# Patient Record
Sex: MALE | Race: OTHER | NOT HISPANIC OR LATINO
[De-identification: names, ages, dates, MRNs, and addresses within clinical notes are randomized per-mention and may not be internally consistent; named-entity substitution may affect disease eponyms.]

---

## 2017-09-24 ENCOUNTER — TRANSCRIPTION ENCOUNTER (OUTPATIENT)
Age: 57
End: 2017-09-24

## 2023-05-10 PROBLEM — Z00.00 ENCOUNTER FOR PREVENTIVE HEALTH EXAMINATION: Status: ACTIVE | Noted: 2023-05-10

## 2023-05-11 ENCOUNTER — APPOINTMENT (OUTPATIENT)
Dept: NEUROSURGERY | Facility: CLINIC | Age: 63
End: 2023-05-11
Payer: COMMERCIAL

## 2023-05-11 VITALS — HEIGHT: 64 IN | BODY MASS INDEX: 36.37 KG/M2 | WEIGHT: 213 LBS

## 2023-05-11 DIAGNOSIS — Z82.49 FAMILY HISTORY OF ISCHEMIC HEART DISEASE AND OTHER DISEASES OF THE CIRCULATORY SYSTEM: ICD-10-CM

## 2023-05-11 DIAGNOSIS — D49.519 NEOPLASM OF UNSPECIFIED BEHAVIOR OF UNSPECIFIED KIDNEY: ICD-10-CM

## 2023-05-11 DIAGNOSIS — F17.200 NICOTINE DEPENDENCE, UNSPECIFIED, UNCOMPLICATED: ICD-10-CM

## 2023-05-11 PROCEDURE — 99204 OFFICE O/P NEW MOD 45 MIN: CPT

## 2023-05-11 RX ORDER — OLMESARTAN MEDOXOMIL / AMLODIPINE BESYLATE / HYDROCHLOROTHIAZIDE 40; 10; 25 MG/1; MG/1; MG/1
TABLET, FILM COATED ORAL
Refills: 0 | Status: ACTIVE | COMMUNITY

## 2023-05-11 RX ORDER — LABETALOL HYDROCHLORIDE 300 MG/1
TABLET, FILM COATED ORAL
Refills: 0 | Status: ACTIVE | COMMUNITY

## 2023-05-11 RX ORDER — ROSUVASTATIN CALCIUM 5 MG/1
TABLET, FILM COATED ORAL
Refills: 0 | Status: ACTIVE | COMMUNITY

## 2023-05-11 RX ORDER — FEBUXOSTAT 80 MG/1
80 TABLET ORAL
Refills: 0 | Status: ACTIVE | COMMUNITY

## 2023-05-11 NOTE — HISTORY OF PRESENT ILLNESS
[de-identified] : This is a 63 yo M who presents for neurosurgical consultation, he is accompanied by his wife. As a review, he notes progressive ambulatory changes along with urinary incontinence spanning the course of 1 year. He had presented to various specialists without answer. Ultimately, MR brain revealed ventriculomegaly to a moderate degree. Shuffling gait reported along with cognitive decline as per patients wife.\par \par MR Brain- [facility]- 3/2023- evidence of ventriculomegaly to a moderate degree\par \par PHYSICAL EXAM: \par \par Constitutional: Well appearing, no distress\par HEENT: Normocephalic Atraumatic\par Psychiatric: Alert and oriented to all spheres, normal mood\par Pulmonary: No respiratory distress\par \par Neurologic: \par CN II-XII grossly intact\par Palpation: no pain to palpation \par Strength: Full strength in all major muscle groups\par Sensation: Full sensation to light touch in all extremities\par Reflexes: \par  2+ patellar\par  2+ ankle jerk\par \par ROM limited\par \par Signs:\par SLR negative\par \par Gait: slow, shuffling gait. Walking w/o assistance.\par

## 2023-05-11 NOTE — ASSESSMENT
[FreeTextEntry1] : This is a 63 yo M who presents for neurosurgical consultation with regards to NPH. We have discussed that after review of his imaging/testing he is a candidate for a high volume LP. Pending the result, he may be a candidate for  shunt placement and we have discussed all relevant procedure details at length and he and his wife are agreeable to proceed.\par \par Bloodwork RX given: CBC, BMP, PT/ INR, PTT\par Rx: Lumbar puncture, high volume tap\par \par Patient to return to office 3 days s/p tap to assess for improvement in gait/cognition/incontinence.\par \par All questions and concerns have been answered at this time and he is largely agreeable to proceed.\par \par Elizabeth Serna PA-C \par Christy Garza MD\par

## 2023-06-05 NOTE — ASU PATIENT PROFILE, ADULT - FALL HARM RISK - RISK INTERVENTIONS

## 2023-06-06 ENCOUNTER — INPATIENT (INPATIENT)
Facility: HOSPITAL | Age: 63
LOS: 1 days | Discharge: HOME | DRG: 57 | End: 2023-06-08
Attending: NEUROLOGICAL SURGERY | Admitting: NEUROLOGICAL SURGERY
Payer: COMMERCIAL

## 2023-06-06 VITALS — WEIGHT: 220.9 LBS | HEIGHT: 60.4 IN

## 2023-06-06 DIAGNOSIS — G91.2 (IDIOPATHIC) NORMAL PRESSURE HYDROCEPHALUS: ICD-10-CM

## 2023-06-06 DIAGNOSIS — D30.02 BENIGN NEOPLASM OF LEFT KIDNEY: Chronic | ICD-10-CM

## 2023-06-06 DIAGNOSIS — D30.01 BENIGN NEOPLASM OF RIGHT KIDNEY: Chronic | ICD-10-CM

## 2023-06-06 PROCEDURE — 82945 GLUCOSE OTHER FLUID: CPT

## 2023-06-06 PROCEDURE — 93005 ELECTROCARDIOGRAM TRACING: CPT

## 2023-06-06 PROCEDURE — 85730 THROMBOPLASTIN TIME PARTIAL: CPT

## 2023-06-06 PROCEDURE — 85025 COMPLETE CBC W/AUTO DIFF WBC: CPT

## 2023-06-06 PROCEDURE — C1729: CPT

## 2023-06-06 PROCEDURE — 71045 X-RAY EXAM CHEST 1 VIEW: CPT

## 2023-06-06 PROCEDURE — 70450 CT HEAD/BRAIN W/O DYE: CPT

## 2023-06-06 PROCEDURE — 84100 ASSAY OF PHOSPHORUS: CPT

## 2023-06-06 PROCEDURE — 85610 PROTHROMBIN TIME: CPT

## 2023-06-06 PROCEDURE — 97162 PT EVAL MOD COMPLEX 30 MIN: CPT | Mod: GP

## 2023-06-06 PROCEDURE — 80053 COMPREHEN METABOLIC PANEL: CPT

## 2023-06-06 PROCEDURE — 87205 SMEAR GRAM STAIN: CPT

## 2023-06-06 PROCEDURE — 87102 FUNGUS ISOLATION CULTURE: CPT

## 2023-06-06 PROCEDURE — 87070 CULTURE OTHR SPECIMN AEROBIC: CPT

## 2023-06-06 PROCEDURE — 84157 ASSAY OF PROTEIN OTHER: CPT

## 2023-06-06 PROCEDURE — 36415 COLL VENOUS BLD VENIPUNCTURE: CPT

## 2023-06-06 PROCEDURE — 87116 MYCOBACTERIA CULTURE: CPT

## 2023-06-06 PROCEDURE — 87483 CNS DNA AMP PROBE TYPE 12-25: CPT

## 2023-06-06 PROCEDURE — 72100 X-RAY EXAM L-S SPINE 2/3 VWS: CPT

## 2023-06-06 PROCEDURE — 97166 OT EVAL MOD COMPLEX 45 MIN: CPT | Mod: GO

## 2023-06-06 PROCEDURE — 86850 RBC ANTIBODY SCREEN: CPT

## 2023-06-06 PROCEDURE — 83735 ASSAY OF MAGNESIUM: CPT

## 2023-06-06 PROCEDURE — 97116 GAIT TRAINING THERAPY: CPT | Mod: GP

## 2023-06-06 PROCEDURE — 86901 BLOOD TYPING SEROLOGIC RH(D): CPT

## 2023-06-06 PROCEDURE — 99253 IP/OBS CNSLTJ NEW/EST LOW 45: CPT

## 2023-06-06 PROCEDURE — 80048 BASIC METABOLIC PNL TOTAL CA: CPT

## 2023-06-06 PROCEDURE — 85027 COMPLETE CBC AUTOMATED: CPT

## 2023-06-06 PROCEDURE — 86900 BLOOD TYPING SEROLOGIC ABO: CPT

## 2023-06-06 PROCEDURE — 97530 THERAPEUTIC ACTIVITIES: CPT | Mod: GP

## 2023-06-06 PROCEDURE — 89051 BODY FLUID CELL COUNT: CPT

## 2023-06-06 PROCEDURE — 83615 LACTATE (LD) (LDH) ENZYME: CPT

## 2023-06-06 RX ORDER — OXYCODONE AND ACETAMINOPHEN 5; 325 MG/1; MG/1
2 TABLET ORAL ONCE
Refills: 0 | Status: DISCONTINUED | OUTPATIENT
Start: 2023-06-06 | End: 2023-06-07

## 2023-06-06 RX ORDER — MEPERIDINE HYDROCHLORIDE 50 MG/ML
12.5 INJECTION INTRAMUSCULAR; INTRAVENOUS; SUBCUTANEOUS ONCE
Refills: 0 | Status: DISCONTINUED | OUTPATIENT
Start: 2023-06-06 | End: 2023-06-08

## 2023-06-06 RX ORDER — CEFAZOLIN SODIUM 1 G
2000 VIAL (EA) INJECTION EVERY 8 HOURS
Refills: 0 | Status: DISCONTINUED | OUTPATIENT
Start: 2023-06-06 | End: 2023-06-08

## 2023-06-06 RX ORDER — ACETAMINOPHEN 500 MG
650 TABLET ORAL EVERY 6 HOURS
Refills: 0 | Status: DISCONTINUED | OUTPATIENT
Start: 2023-06-06 | End: 2023-06-08

## 2023-06-06 RX ORDER — SENNA PLUS 8.6 MG/1
2 TABLET ORAL AT BEDTIME
Refills: 0 | Status: DISCONTINUED | OUTPATIENT
Start: 2023-06-06 | End: 2023-06-08

## 2023-06-06 RX ORDER — HYDROMORPHONE HYDROCHLORIDE 2 MG/ML
0.5 INJECTION INTRAMUSCULAR; INTRAVENOUS; SUBCUTANEOUS
Refills: 0 | Status: DISCONTINUED | OUTPATIENT
Start: 2023-06-06 | End: 2023-06-07

## 2023-06-06 RX ORDER — ONDANSETRON 8 MG/1
4 TABLET, FILM COATED ORAL ONCE
Refills: 0 | Status: DISCONTINUED | OUTPATIENT
Start: 2023-06-06 | End: 2023-06-08

## 2023-06-06 RX ORDER — SODIUM CHLORIDE 9 MG/ML
1000 INJECTION INTRAMUSCULAR; INTRAVENOUS; SUBCUTANEOUS
Refills: 0 | Status: DISCONTINUED | OUTPATIENT
Start: 2023-06-06 | End: 2023-06-08

## 2023-06-06 RX ORDER — SODIUM CHLORIDE 9 MG/ML
1000 INJECTION, SOLUTION INTRAVENOUS
Refills: 0 | Status: DISCONTINUED | OUTPATIENT
Start: 2023-06-06 | End: 2023-06-07

## 2023-06-06 RX ADMIN — Medication 100 MILLIGRAM(S): at 21:53

## 2023-06-06 RX ADMIN — SODIUM CHLORIDE 75 MILLILITER(S): 9 INJECTION INTRAMUSCULAR; INTRAVENOUS; SUBCUTANEOUS at 21:56

## 2023-06-06 NOTE — CHART NOTE - NSCHARTNOTEFT_GEN_A_CORE
PACU ANESTHESIA ADMISSION NOTE      Procedure: Insertion of lumbar drain  Post op diagnosis:  normopressure hydrocephalus      __x__  Patent Airway    _x___  Full return of protective reflexes    ___x_  Full recovery from anesthesia / back to baseline     Vitals:   T: 98.0          R:      18            BP:    165/77              Sat:  97%                 P: 79      Mental Status:  __x__ Awake   _____ Alert   _____ Drowsy   _____ Sedated    Nausea/Vomiting:  ____ NO  ______Yes,   See Post - Op Orders          Pain Scale (0-10):  _____    Treatment: ____ None    ___x_ See Post - Op/PCA Orders    Post - Operative Fluids:   ____ Oral   __x__ See Post - Op Orders    Plan: Discharge:   ____Home       _____Floor     __x___Critical Care    _____  Other:_________________    Comments: Pt tolerated procedure well, no anesthesia related complications. Care of pt endorsed to PACU, report given to PACU RN. Discharge when criteria are met. PACU ANESTHESIA ADMISSION NOTE      Procedure: Insertion of lumbar drain  Post op diagnosis:  normopressure hydrocephalus      __x__  Patent Airway    _x___  Full return of protective reflexes    ___x_  Full recovery from anesthesia / back to baseline     Vitals:   T: 98.0          R:      18            BP:    165/77              Sat:  97%                 P: 79      Mental Status:  __x__ Awake   _____ Alert   _____ Drowsy   _____ Sedated    Nausea/Vomiting:  ____ NO  ______Yes,   See Post - Op Orders          Pain Scale (0-10):  _____    Treatment: ____ None    ___x_ See Post - Op/PCA Orders    Post - Operative Fluids:   ____ Oral   __x__ See Post - Op Orders    Plan: Discharge:   ____Home       _____Floor     __x___Critical Care    _____  Other:_________________    Comments: Pt tolerated procedure well, no anesthesia related complications. Care of pt endorsed to PACU, report given to PACU RN. Discharge when criteria are met. Report given at to Neuro ICU resident at 1834

## 2023-06-06 NOTE — PROGRESS NOTE ADULT - SUBJECTIVE AND OBJECTIVE BOX
NEUROSURGERY POST OP NOTE:      · Primary Surgeon	Greg  Pre-Op Diagnosis: Normal Pressure Hydrocephalus  PROCEDURES: Insertion of lumbar drain  · Operative Findings	CSF  · Estimated Blood Loss	1 milliLiter(s)         Pt sitting up in bed in no acute distress. Pt denies headache. Tolerating PO w/o issues.       Vital Signs Last 24 Hrs  T(C): 36.7 (06 Jun 2023 20:00), Max: 36.8 (06 Jun 2023 18:26)  T(F): 98 (06 Jun 2023 20:00), Max: 98.2 (06 Jun 2023 18:26)  HR: 72 (06 Jun 2023 23:00) (66 - 77)  BP: 122/72 (06 Jun 2023 23:00) (112/63 - 165/77)  BP(mean): --  RR: 18 (06 Jun 2023 23:00) (15 - 21)  SpO2: 97% (06 Jun 2023 23:00) (94% - 98%)      06-06-23 @ 07:01  -  06-06-23 @ 23:55  --------------------------------------------------------  IN: 0 mL / OUT: 48 mL / NET: -48 mL        acetaminophen     Tablet .. 650 milliGRAM(s) Oral every 6 hours PRN  bisacodyl 5 milliGRAM(s) Oral every 12 hours PRN  ceFAZolin   IVPB 2000 milliGRAM(s) IV Intermittent every 8 hours  HYDROmorphone  Injectable 0.5 milliGRAM(s) IV Push every 10 minutes PRN  lactated ringers. 1000 milliLiter(s) IV Continuous <Continuous>  meperidine     Injectable 12.5 milliGRAM(s) IV Push once PRN  ondansetron Injectable 4 milliGRAM(s) IV Push once PRN  oxycodone    5 mG/acetaminophen 325 mG 2 Tablet(s) Oral once PRN  senna 2 Tablet(s) Oral at bedtime  sodium chloride 0.9%. 1000 milliLiter(s) IV Continuous <Continuous>      Exam:  AAOX3. Verbal function intact  tongue midline, facial motions symmetric  PERRLA, EOMI  Pronator Drift: NONe   Finger to Nose intact  Motor: MAEx4, 5/5 power in b/l UE and LE  Sensation: intact to touch in all extremities  WOUND; C/D/I  DRAINS: Lumbar clear CSF 2-15cc /hr        Assessment: 61 y/o M with Ataxic Gait s/p Lumbar drain       Plan: Continue ABX for 24H          Drain 10-12CC/Hr         PT evaluation in AM         TOV          Neuro checks q 1H

## 2023-06-06 NOTE — CONSULT NOTE ADULT - ASSESSMENT
62M current smoker (1 ppd) with PMHx of HTN, benign kidney tumor s/p resection 2013, NPH presents s/p insertion of lumbar drain for NPH.      Plan    Neurological:    - Neuro checks Q1  - Monitor drain output  - Analgesia: Tylenol/Tramadol 25Q6, Dilaudid PRN  - Neurosx following  - Activity: Bedrest  - OT/PT    Cardiovascular:  - Keep MAP > 65 and SBP <140  - EKG - pending  - Restart home AntiHTN      Pulmonary:  - HOB 30  - Maintain SaO2 > 92%  - incentive spirometry    GI:  - Diet: Regular  - GI ppx- not indicated  - Bowel Regimen: Senna/Miralax     :  - IVF  - Strict Is/Os  - Keep normonatremic  - Maintain Mg > 2, K >4, Phos> 3  - Voiding freely    Endo:  - Keep euglycemia    Hematology:  - SCDs    ID:  - Afebrile  - procedural Ancef      Code Status: Full   62M current smoker (1 ppd) with PMHx of HTN, benign kidney tumor s/p resection 2013, NPH presents s/p insertion of lumbar drain for NPH.      Plan    Neurological:    - Neuro checks Q1  - Monitor drain output- remove 10- 12 cc/hr and clamp after achieving goal   - Analgesia: Tylenol/Tramadol 25Q6, Dilaudid PRN  - Neurosx following  - Activity: Bedrest  - OT/PT    Cardiovascular:  - Keep MAP > 65 and SBP <140  - EKG - pending  - Restart home AntiHTN      Pulmonary:  - HOB 30  - Maintain SaO2 > 92%  - incentive spirometry    GI:  - Diet: Regular  - GI ppx- not indicated  - Bowel Regimen: Senna/Miralax     :  - IVF  - Strict Is/Os  - Keep normonatremic  - Maintain Mg > 2, K >4, Phos> 3  - Voiding freely    Endo:  - Keep euglycemia    Hematology:  - SCDs    ID:  - Afebrile  - procedural Ancef      Code Status: Full

## 2023-06-06 NOTE — CONSULT NOTE ADULT - SUBJECTIVE AND OBJECTIVE BOX
SUMMARY:  HPI:  62M with PMHx of HTN, benign tumor   OVERNIGHT EVENTS:     ADMISSION SCORES:   GCS: HH: MF: NIHSS: RASS: CAM-ICU: ICP:  CLINICAL TRIALS:  Allergies    No Known Allergies    Intolerances        REVIEW OF SYSTEMS: [ ] Unable to Assess due to neurologic exam   [ ] All ROS addressed below are non-contributory, except:  Neuro: [ ] Headache [ ] Back pain [ ] Numbness [ ] Weakness [ ] Ataxia [ ] Dizziness [ ] Aphasia [ ] Dysarthria [ ] Visual disturbance  Resp: [ ] Shortness of breath/dyspnea, [ ] Orthopnea [ ] Cough  CV: [ ] Chest pain [ ] Palpitation [ ] Lightheadedness [ ] Syncope  Renal: [ ] Thirst [ ] Edema  GI: [ ] Nausea [ ] Emesis [ ] Abdominal pain [ ] Constipation [ ] Diarrhea  Hem: [ ] Hematemesis [ ] bright red blood per rectum  ID: [ ] Fever [ ] Chills [ ] Dysuria  ENT: [ ] Rhinorrhea    DEVICES:   [ ] Restraints [ ] ET tube [ ] central line [ ] arterial line [ ] gonzalez [ ] NGT/OGT [ ] EVD [ ] LD [ ] JACKIE/HMV [ ] Trach [ ] PEG [ ] Chest Tube     VITALS: [X] Reviewed  Vital Signs Last 24 Hrs  T(C): 36.8 (06 Jun 2023 18:26), Max: 36.8 (06 Jun 2023 18:26)  T(F): 98.2 (06 Jun 2023 18:26), Max: 98.2 (06 Jun 2023 18:26)  HR: 68 (06 Jun 2023 19:00) (66 - 77)  BP: 133/86 (06 Jun 2023 19:00) (118/69 - 165/77)  BP(mean): --  RR: 20 (06 Jun 2023 19:00) (15 - 21)  SpO2: 97% (06 Jun 2023 19:00) (97% - 98%)    Parameters below as of 06 Jun 2023 19:00  Patient On (Oxygen Delivery Method): room air      CAPILLARY BLOOD GLUCOSE            LABS:              STROKE CORE MEASURES:      MEDICATION LEVELS:     IMAGING/DATA: [X] Reviewed    IVF FLUIDS/MEDICATIONS: [X] Reviewed  MEDICATIONS  (STANDING):  ceFAZolin   IVPB 2000 milliGRAM(s) IV Intermittent every 8 hours  lactated ringers. 1000 milliLiter(s) (100 mL/Hr) IV Continuous <Continuous>  senna 2 Tablet(s) Oral at bedtime  sodium chloride 0.9%. 1000 milliLiter(s) (75 mL/Hr) IV Continuous <Continuous>    MEDICATIONS  (PRN):  acetaminophen     Tablet .. 650 milliGRAM(s) Oral every 6 hours PRN Temp greater or equal to 38.5C (101.3F), Mild Pain (1 - 3)  bisacodyl 5 milliGRAM(s) Oral every 12 hours PRN Constipation  HYDROmorphone  Injectable 0.5 milliGRAM(s) IV Push every 10 minutes PRN Moderate Pain (4 - 6)  meperidine     Injectable 12.5 milliGRAM(s) IV Push once PRN Shivering  ondansetron Injectable 4 milliGRAM(s) IV Push once PRN Nausea and/or Vomiting  oxycodone    5 mG/acetaminophen 325 mG 2 Tablet(s) Oral once PRN Mild Pain (1 - 3)    I&O's Summary    06 Jun 2023 07:01  -  06 Jun 2023 19:26  --------------------------------------------------------  IN: 0 mL / OUT: 2 mL / NET: -2 mL        EXAMINATION:  PHYSICAL EXAM:    Constitutional: No Acute Distress     Neurological: Awake, alert oriented to person, place and time, Following Commands, PERRL, EOMI, No Gaze Preference, Face Symmetrical, Speech Fluent, No dysmetria, No ataxia, No nystagmus     Motor exam:          Upper extremity                         Delt     Bicep     Tricep    HG                                                 R         5/5        5/5        5/5       5/5                                               L          5/5        5/5        5/5       5/5          Lower extremity                        HF         KF        KE       DF         PF                                                  R        5/5        5/5        5/5       5/5         5/5                                               L         5/5        5/5       5/5       5/5          5/5                                                 Sensation: [ ] intact to light touch  [ ] decreased:     Reflexes: Deep Tendon Reflexes Intact     Pulmonary: Clear to Auscultation, No rales, No rhonchi, No wheezes     Cardiovascular: S1, S2, Regular rate and rhythm     Gastrointestinal: Soft, Non-tender, Non-distended     Extremities: No calf tenderness     Incision:    62M current smoker (1 ppd) with PMHx of HTN, benign kidney tumor s/p resection 2013, NPH presents for lumbar drain for NPH. Intraop was uneventful.         EXAMINATION:  PHYSICAL EXAM:    Constitutional: No Acute Distress     Neurological: Awake, alert oriented to person, place and time, Following Commands, PERRL, EOMI, No Gaze Preference, Face Symmetrical, Speech Fluent, No dysmetria, No ataxia, No nystagmus     Motor exam:          Upper extremity                         Delt     Bicep     Tricep    HG                                                 R         5/5        5/5        5/5       5/5                                               L          5/5        5/5        5/5       5/5          Lower extremity                        HF         KF        KE       DF         PF                                                  R        5/5        5/5        5/5       5/5         5/5                                               L         5/5        5/5       5/5       5/5          5/5                                                 Sensation: [ ] intact to light touch  [ ] decreased:     Reflexes: Deep Tendon Reflexes Intact     Pulmonary: Clear to Auscultation, No rales, No rhonchi, No wheezes     Cardiovascular: S1, S2, Regular rate and rhythm     Gastrointestinal: Soft, Non-tender, Non-distended     Extremities: No calf tenderness     Incision:    62M current smoker (1 ppd) with PMHx of HTN, benign kidney tumor s/p resection 2013, NPH presents s/p insertion of lumbar drain for NPH. Intraop was uneventful. Admitted to [facility] for further management.        PHYSICAL EXAM:  Constitutional: No Acute Distress   Reflexes: Deep Tendon Reflexes Intact   Pulmonary: Clear to Auscultation, No rales, No rhonchi, No wheezes   Cardiovascular: S1, S2, Regular rate and rhythm   Gastrointestinal: Soft, Non-tender, Non-distended   Extremities: No calf tenderness   Incision: clean and dry  Neurological: Awake, alert oriented to person, place and time, Following Commands, PERRL, EOMI, No Gaze Preference, Face Symmetrical, Speech Fluent, No dysmetria, No ataxia, No nystagmus     Motor exam:          Upper extremity                         Delt     Bicep     Tricep    HG                                                 R         5/5        5/5        5/5       5/5                                               L          5/5        5/5        5/5       5/5          Lower extremity                        HF         KF        KE       DF         PF                                                  R        5/5        5/5        5/5       5/5         5/5                                               L         5/5        5/5       5/5       5/5          5/5      ICU Vital Signs Last 24 Hrs  T(C): 36.8 (06 Jun 2023 18:26), Max: 36.8 (06 Jun 2023 18:26)  T(F): 98.2 (06 Jun 2023 18:26), Max: 98.2 (06 Jun 2023 18:26)  HR: 68 (06 Jun 2023 19:00) (66 - 77)  BP: 133/86 (06 Jun 2023 19:00) (118/69 - 165/77)  BP(mean): --  ABP: --  ABP(mean): --  RR: 20 (06 Jun 2023 19:00) (15 - 21)  SpO2: 97% (06 Jun 2023 19:00) (97% - 98%)      06-06-23 @ 07:01  -  06-06-23 @ 20:01  --------------------------------------------------------  IN: 0 mL / OUT: 17 mL / NET: -17 mL          LABS:  Na:   K:   Cl:   CO2:   BUN:   Cr:   Glu:     Hgb:   Hct:   WBC:   Plt:     INR:   PTT:                   MEDICATIONS  (STANDING):  ceFAZolin   IVPB 2000 milliGRAM(s) IV Intermittent every 8 hours  lactated ringers. 1000 milliLiter(s) (100 mL/Hr) IV Continuous <Continuous>  senna 2 Tablet(s) Oral at bedtime  sodium chloride 0.9%. 1000 milliLiter(s) (75 mL/Hr) IV Continuous <Continuous>    MEDICATIONS  (PRN):  acetaminophen     Tablet .. 650 milliGRAM(s) Oral every 6 hours PRN Temp greater or equal to 38.5C (101.3F), Mild Pain (1 - 3)  bisacodyl 5 milliGRAM(s) Oral every 12 hours PRN Constipation  HYDROmorphone  Injectable 0.5 milliGRAM(s) IV Push every 10 minutes PRN Moderate Pain (4 - 6)  meperidine     Injectable 12.5 milliGRAM(s) IV Push once PRN Shivering  ondansetron Injectable 4 milliGRAM(s) IV Push once PRN Nausea and/or Vomiting  oxycodone    5 mG/acetaminophen 325 mG 2 Tablet(s) Oral once PRN Mild Pain (1 - 3)   62M current smoker (1 ppd) with PMHx of HTN, benign kidney tumor s/p resection 2013, NPH presents s/p insertion of lumbar drain for NPH. Intraop was uneventful. Admitted to [facility] for further management.      ICU Vital Signs Last 24 Hrs  T(C): 36.8 (06 Jun 2023 18:26), Max: 36.8 (06 Jun 2023 18:26)  T(F): 98.2 (06 Jun 2023 18:26), Max: 98.2 (06 Jun 2023 18:26)  HR: 68 (06 Jun 2023 19:00) (66 - 77)  BP: 133/86 (06 Jun 2023 19:00) (118/69 - 165/77)  RR: 20 (06 Jun 2023 19:00) (15 - 21)  SpO2: 97% (06 Jun 2023 19:00) (97% - 98%)    O2 Parameters below as of 06 Jun 2023 19:00  Patient On (Oxygen Delivery Method): room air      06 Jun 2023 07:01  -  06 Jun 2023 20:42  --------------------------------------------------------  IN:  Total IN: 0 mL    OUT:    Drain (mL): 17 mL  Total OUT: 17 mL    Total NET: -17 mL        MEDICATIONS  (STANDING):  ceFAZolin   IVPB 2000 milliGRAM(s) IV Intermittent every 8 hours  lactated ringers. 1000 milliLiter(s) (100 mL/Hr) IV Continuous <Continuous>  senna 2 Tablet(s) Oral at bedtime  sodium chloride 0.9%. 1000 milliLiter(s) (75 mL/Hr) IV Continuous <Continuous>    MEDICATIONS  (PRN):  acetaminophen     Tablet .. 650 milliGRAM(s) Oral every 6 hours PRN Temp greater or equal to 38.5C (101.3F), Mild Pain (1 - 3)  bisacodyl 5 milliGRAM(s) Oral every 12 hours PRN Constipation  HYDROmorphone  Injectable 0.5 milliGRAM(s) IV Push every 10 minutes PRN Moderate Pain (4 - 6)  meperidine     Injectable 12.5 milliGRAM(s) IV Push once PRN Shivering  ondansetron Injectable 4 milliGRAM(s) IV Push once PRN Nausea and/or Vomiting  oxycodone    5 mG/acetaminophen 325 mG 2 Tablet(s) Oral once PRN Mild Pain (1 - 3)              PHYSICAL EXAM:  Constitutional: No Acute Distress   Reflexes: Deep Tendon Reflexes Intact   Pulmonary: Clear to Auscultation, No rales, No rhonchi, No wheezes   Cardiovascular: S1, S2, Regular rate and rhythm   Gastrointestinal: Soft, Non-tender, Non-distended   Extremities: No calf tenderness   Incision: clean and dry  Neurological: Awake, alert oriented to person, place and time, Following Commands, PERRL, EOMI, No Gaze Preference, Face Symmetrical, Speech Fluent, No dysmetria, No ataxia, No nystagmus

## 2023-06-07 LAB
ABO RH CONFIRMATION: SIGNIFICANT CHANGE UP
ALBUMIN SERPL ELPH-MCNC: 3.9 G/DL — SIGNIFICANT CHANGE UP (ref 3.5–5.2)
ALP SERPL-CCNC: 67 U/L — SIGNIFICANT CHANGE UP (ref 30–115)
ALT FLD-CCNC: 18 U/L — SIGNIFICANT CHANGE UP (ref 0–41)
ANION GAP SERPL CALC-SCNC: 10 MMOL/L — SIGNIFICANT CHANGE UP (ref 7–14)
ANION GAP SERPL CALC-SCNC: 14 MMOL/L — SIGNIFICANT CHANGE UP (ref 7–14)
APTT BLD: 38.1 SEC — SIGNIFICANT CHANGE UP (ref 27–39.2)
AST SERPL-CCNC: 16 U/L — SIGNIFICANT CHANGE UP (ref 0–41)
BASOPHILS # BLD AUTO: 0.06 K/UL — SIGNIFICANT CHANGE UP (ref 0–0.2)
BASOPHILS NFR BLD AUTO: 0.7 % — SIGNIFICANT CHANGE UP (ref 0–1)
BILIRUB SERPL-MCNC: 0.8 MG/DL — SIGNIFICANT CHANGE UP (ref 0.2–1.2)
BLD GP AB SCN SERPL QL: SIGNIFICANT CHANGE UP
BUN SERPL-MCNC: 14 MG/DL — SIGNIFICANT CHANGE UP (ref 10–20)
BUN SERPL-MCNC: 15 MG/DL — SIGNIFICANT CHANGE UP (ref 10–20)
CALCIUM SERPL-MCNC: 9.1 MG/DL — SIGNIFICANT CHANGE UP (ref 8.4–10.4)
CALCIUM SERPL-MCNC: 9.3 MG/DL — SIGNIFICANT CHANGE UP (ref 8.4–10.4)
CHLORIDE SERPL-SCNC: 103 MMOL/L — SIGNIFICANT CHANGE UP (ref 98–110)
CHLORIDE SERPL-SCNC: 104 MMOL/L — SIGNIFICANT CHANGE UP (ref 98–110)
CO2 SERPL-SCNC: 22 MMOL/L — SIGNIFICANT CHANGE UP (ref 17–32)
CO2 SERPL-SCNC: 24 MMOL/L — SIGNIFICANT CHANGE UP (ref 17–32)
CREAT SERPL-MCNC: 0.9 MG/DL — SIGNIFICANT CHANGE UP (ref 0.7–1.5)
CREAT SERPL-MCNC: 1 MG/DL — SIGNIFICANT CHANGE UP (ref 0.7–1.5)
EGFR: 84 ML/MIN/1.73M2 — SIGNIFICANT CHANGE UP
EGFR: 95 ML/MIN/1.73M2 — SIGNIFICANT CHANGE UP
EOSINOPHIL # BLD AUTO: 0.19 K/UL — SIGNIFICANT CHANGE UP (ref 0–0.7)
EOSINOPHIL NFR BLD AUTO: 2.2 % — SIGNIFICANT CHANGE UP (ref 0–8)
GLUCOSE SERPL-MCNC: 117 MG/DL — HIGH (ref 70–99)
GLUCOSE SERPL-MCNC: 136 MG/DL — HIGH (ref 70–99)
HCT VFR BLD CALC: 46.4 % — SIGNIFICANT CHANGE UP (ref 42–52)
HCT VFR BLD CALC: 46.5 % — SIGNIFICANT CHANGE UP (ref 42–52)
HGB BLD-MCNC: 15.6 G/DL — SIGNIFICANT CHANGE UP (ref 14–18)
HGB BLD-MCNC: 15.9 G/DL — SIGNIFICANT CHANGE UP (ref 14–18)
IMM GRANULOCYTES NFR BLD AUTO: 0.5 % — HIGH (ref 0.1–0.3)
INR BLD: 1.03 RATIO — SIGNIFICANT CHANGE UP (ref 0.65–1.3)
LYMPHOCYTES # BLD AUTO: 1.69 K/UL — SIGNIFICANT CHANGE UP (ref 1.2–3.4)
LYMPHOCYTES # BLD AUTO: 19.7 % — LOW (ref 20.5–51.1)
MCHC RBC-ENTMCNC: 29.7 PG — SIGNIFICANT CHANGE UP (ref 27–31)
MCHC RBC-ENTMCNC: 30.3 PG — SIGNIFICANT CHANGE UP (ref 27–31)
MCHC RBC-ENTMCNC: 33.5 G/DL — SIGNIFICANT CHANGE UP (ref 32–37)
MCHC RBC-ENTMCNC: 34.3 G/DL — SIGNIFICANT CHANGE UP (ref 32–37)
MCV RBC AUTO: 88.4 FL — SIGNIFICANT CHANGE UP (ref 80–94)
MCV RBC AUTO: 88.4 FL — SIGNIFICANT CHANGE UP (ref 80–94)
MONOCYTES # BLD AUTO: 0.67 K/UL — HIGH (ref 0.1–0.6)
MONOCYTES NFR BLD AUTO: 7.8 % — SIGNIFICANT CHANGE UP (ref 1.7–9.3)
NEUTROPHILS # BLD AUTO: 5.94 K/UL — SIGNIFICANT CHANGE UP (ref 1.4–6.5)
NEUTROPHILS NFR BLD AUTO: 69.1 % — SIGNIFICANT CHANGE UP (ref 42.2–75.2)
NRBC # BLD: 0 /100 WBCS — SIGNIFICANT CHANGE UP (ref 0–0)
NRBC # BLD: 0 /100 WBCS — SIGNIFICANT CHANGE UP (ref 0–0)
PLATELET # BLD AUTO: 174 K/UL — SIGNIFICANT CHANGE UP (ref 130–400)
PLATELET # BLD AUTO: 175 K/UL — SIGNIFICANT CHANGE UP (ref 130–400)
PMV BLD: 10.5 FL — HIGH (ref 7.4–10.4)
PMV BLD: 10.5 FL — HIGH (ref 7.4–10.4)
POTASSIUM SERPL-MCNC: 3.7 MMOL/L — SIGNIFICANT CHANGE UP (ref 3.5–5)
POTASSIUM SERPL-MCNC: 3.8 MMOL/L — SIGNIFICANT CHANGE UP (ref 3.5–5)
POTASSIUM SERPL-SCNC: 3.7 MMOL/L — SIGNIFICANT CHANGE UP (ref 3.5–5)
POTASSIUM SERPL-SCNC: 3.8 MMOL/L — SIGNIFICANT CHANGE UP (ref 3.5–5)
PROT SERPL-MCNC: 6 G/DL — SIGNIFICANT CHANGE UP (ref 6–8)
PROTHROM AB SERPL-ACNC: 11.8 SEC — SIGNIFICANT CHANGE UP (ref 9.95–12.87)
RBC # BLD: 5.25 M/UL — SIGNIFICANT CHANGE UP (ref 4.7–6.1)
RBC # BLD: 5.26 M/UL — SIGNIFICANT CHANGE UP (ref 4.7–6.1)
RBC # FLD: 14.4 % — SIGNIFICANT CHANGE UP (ref 11.5–14.5)
RBC # FLD: 14.5 % — SIGNIFICANT CHANGE UP (ref 11.5–14.5)
SODIUM SERPL-SCNC: 138 MMOL/L — SIGNIFICANT CHANGE UP (ref 135–146)
SODIUM SERPL-SCNC: 139 MMOL/L — SIGNIFICANT CHANGE UP (ref 135–146)
WBC # BLD: 7.76 K/UL — SIGNIFICANT CHANGE UP (ref 4.8–10.8)
WBC # BLD: 8.59 K/UL — SIGNIFICANT CHANGE UP (ref 4.8–10.8)
WBC # FLD AUTO: 7.76 K/UL — SIGNIFICANT CHANGE UP (ref 4.8–10.8)
WBC # FLD AUTO: 8.59 K/UL — SIGNIFICANT CHANGE UP (ref 4.8–10.8)

## 2023-06-07 PROCEDURE — 93010 ELECTROCARDIOGRAM REPORT: CPT

## 2023-06-07 PROCEDURE — 70450 CT HEAD/BRAIN W/O DYE: CPT | Mod: 26

## 2023-06-07 PROCEDURE — 71045 X-RAY EXAM CHEST 1 VIEW: CPT | Mod: 26

## 2023-06-07 PROCEDURE — 99232 SBSQ HOSP IP/OBS MODERATE 35: CPT

## 2023-06-07 RX ORDER — HYDROMORPHONE HYDROCHLORIDE 2 MG/ML
0.5 INJECTION INTRAMUSCULAR; INTRAVENOUS; SUBCUTANEOUS ONCE
Refills: 0 | Status: DISCONTINUED | OUTPATIENT
Start: 2023-06-07 | End: 2023-06-07

## 2023-06-07 RX ORDER — POTASSIUM CHLORIDE 20 MEQ
20 PACKET (EA) ORAL ONCE
Refills: 0 | Status: COMPLETED | OUTPATIENT
Start: 2023-06-07 | End: 2023-06-07

## 2023-06-07 RX ORDER — ICOSAPENT ETHYL 500 MG/1
2 CAPSULE, LIQUID FILLED ORAL
Refills: 0 | DISCHARGE

## 2023-06-07 RX ADMIN — HYDROMORPHONE HYDROCHLORIDE 0.5 MILLIGRAM(S): 2 INJECTION INTRAMUSCULAR; INTRAVENOUS; SUBCUTANEOUS at 22:45

## 2023-06-07 RX ADMIN — SODIUM CHLORIDE 75 MILLILITER(S): 9 INJECTION INTRAMUSCULAR; INTRAVENOUS; SUBCUTANEOUS at 14:24

## 2023-06-07 RX ADMIN — Medication 650 MILLIGRAM(S): at 09:00

## 2023-06-07 RX ADMIN — HYDROMORPHONE HYDROCHLORIDE 0.5 MILLIGRAM(S): 2 INJECTION INTRAMUSCULAR; INTRAVENOUS; SUBCUTANEOUS at 04:11

## 2023-06-07 RX ADMIN — Medication 650 MILLIGRAM(S): at 09:30

## 2023-06-07 RX ADMIN — Medication 650 MILLIGRAM(S): at 22:42

## 2023-06-07 RX ADMIN — Medication 650 MILLIGRAM(S): at 22:12

## 2023-06-07 RX ADMIN — Medication 650 MILLIGRAM(S): at 15:02

## 2023-06-07 RX ADMIN — Medication 100 MILLIGRAM(S): at 14:21

## 2023-06-07 RX ADMIN — Medication 20 MILLIEQUIVALENT(S): at 17:33

## 2023-06-07 RX ADMIN — HYDROMORPHONE HYDROCHLORIDE 0.5 MILLIGRAM(S): 2 INJECTION INTRAMUSCULAR; INTRAVENOUS; SUBCUTANEOUS at 02:48

## 2023-06-07 RX ADMIN — Medication 100 MILLIGRAM(S): at 21:24

## 2023-06-07 RX ADMIN — SENNA PLUS 2 TABLET(S): 8.6 TABLET ORAL at 21:25

## 2023-06-07 RX ADMIN — Medication 100 MILLIGRAM(S): at 06:44

## 2023-06-07 RX ADMIN — Medication 650 MILLIGRAM(S): at 16:00

## 2023-06-07 NOTE — PHYSICAL THERAPY INITIAL EVALUATION ADULT - GAIT TRAINING, PT EVAL
Pt will ambulate using RW or least restrictive AD for 300 ft with MI by discharge to facilitate return to PLOF. Pt will negotiate 12 steps using 1 HR under supervision.

## 2023-06-07 NOTE — PROGRESS NOTE ADULT - SUBJECTIVE AND OBJECTIVE BOX
Hospital Course:   POD# 1 s/p placement of lumbar drain    Vital Signs Last 24 Hrs  T(C): 36.9 (07 Jun 2023 08:00), Max: 36.9 (07 Jun 2023 08:00)  T(F): 98.5 (07 Jun 2023 08:00), Max: 98.5 (07 Jun 2023 08:00)  HR: 67 (07 Jun 2023 10:00) (66 - 78)  BP: 144/88 (07 Jun 2023 10:00) (112/51 - 165/77)  BP(mean): --  RR: 19 (07 Jun 2023 10:00) (14 - 21)  SpO2: 95% (07 Jun 2023 10:00) (94% - 98%)    Parameters below as of 07 Jun 2023 10:00  Patient On (Oxygen Delivery Method): nasal cannula  O2 Flow (L/min): 2    I&O's Detail    06 Jun 2023 07:01  -  07 Jun 2023 07:00  --------------------------------------------------------  IN:    sodium chloride 0.9%: 75 mL  Total IN: 75 mL    OUT:    Drain (mL): 108 mL    Voided (mL): 400 mL  Total OUT: 508 mL    Total NET: -433 mL      07 Jun 2023 07:01  -  07 Jun 2023 11:04  --------------------------------------------------------  IN:    Oral Fluid: 200 mL    sodium chloride 0.9%: 300 mL  Total IN: 500 mL    OUT:    Drain (mL): 30 mL    Voided (mL): 100 mL  Total OUT: 130 mL    Total NET: 370 mL    I&O's Summary    06 Jun 2023 07:01  -  07 Jun 2023 07:00  --------------------------------------------------------  IN: 75 mL / OUT: 508 mL / NET: -433 mL    07 Jun 2023 07:01  -  07 Jun 2023 11:04  --------------------------------------------------------  IN: 500 mL / OUT: 130 mL / NET: 370 mL      PHYSICAL EXAM:  Neurological: awake, alert x3, NAD, cooperative, in good spirits, follows commands, PETERSON with good strength, seen by PT, has balance issues    Incision/Wound: dressing dry, drain intact, CSF clear    LABS:                        15.9   8.59  )-----------( 175      ( 07 Jun 2023 04:45 )             46.4     06-07    139  |  103  |  14  ----------------------------<  117<H>  3.7   |  22  |  0.9    Ca    9.3      07 Jun 2023 04:45    TPro  6.0  /  Alb  3.9  /  TBili  0.8  /  DBili  x   /  AST  16  /  ALT  18  /  AlkPhos  67  06-07    CAPILLARY BLOOD GLUCOSE    Drug Levels: [] N/A    CSF Analysis: [] N/A    Allergies    No Known Allergies    Intolerances    MEDICATIONS:  Antibiotics:  ceFAZolin   IVPB 2000 milliGRAM(s) IV Intermittent every 8 hours    Neuro:  acetaminophen     Tablet .. 650 milliGRAM(s) Oral every 6 hours PRN  meperidine     Injectable 12.5 milliGRAM(s) IV Push once PRN  ondansetron Injectable 4 milliGRAM(s) IV Push once PRN    Anticoagulation:    OTHER:  bisacodyl 5 milliGRAM(s) Oral every 12 hours PRN  senna 2 Tablet(s) Oral at bedtime    IVF:  sodium chloride 0.9%. 1000 milliLiter(s) IV Continuous <Continuous>    CULTURES:    RADIOLOGY & ADDITIONAL TESTS:      ASSESSMENT:  62y Male s/p placement of lumbar drain for evaluation for NPH    Idiopathic normal pressure hydrocephalus    Handoff    HTN (hypertension)    Insertion of lumbar drain    Kidney tumor (benign), right    Kidney, benign tumor, left    [id number]    [id number]    PLAN: continue PT eval,  possible VPS Friday.

## 2023-06-07 NOTE — PHYSICAL THERAPY INITIAL EVALUATION ADULT - ADDITIONAL COMMENTS
Pt resides with wife in a town house, using no steps to enter, and 2 flights of stairs to access bedroom. Pt used to be independent with overall functional mobility, able to ambulate without AD at baseline

## 2023-06-07 NOTE — PROGRESS NOTE ADULT - ASSESSMENT
62M current smoker (1 ppd) with PMHx of HTN, benign kidney tumor s/p resection 2013, NPH presents s/p insertion of lumbar drain for NPH.      Plan    Neurological:    - Neuro checks Q1  - Monitor drain output- remove 10- 12 cc/hr and clamp after achieving goal   - Analgesia: Tylenol/Tramadol 25Q6, Dilaudid PRN  - Neurosx following  - Activity: Bedrest  - OT/PT    Cardiovascular:  - Keep MAP > 65 and SBP <140  - EKG - pending  - Restart home AntiHTN      Pulmonary:  - HOB 30  - Maintain SaO2 > 92%  - incentive spirometry    GI:  - Diet: Regular  - GI ppx- not indicated  - Bowel Regimen: Senna/Miralax     :  - IVF  - Strict Is/Os  - Keep normonatremic  - Maintain Mg > 2, K >4, Phos> 3  - Voiding freely    Endo:  - Keep euglycemia    Hematology:  - SCDs    ID:  - Afebrile  - procedural Ancef      Code Status: Full

## 2023-06-07 NOTE — PHYSICAL THERAPY INITIAL EVALUATION ADULT - NSPTDMEREC_GEN_A_CORE
----- Message from ARGENIS Whitaker sent at 9/8/2021  4:32 PM CDT -----  Please inform mother that the x-ray indicated a viral infection lungs.  No sign of bacterial pneumonia.  Continue plan as discussed in clinic.  
Writer conveyed message below to mother, Leydi. Mother verbalized understanding and reports no further questions at this time.   
for safety/rolling walker

## 2023-06-07 NOTE — PHYSICAL THERAPY INITIAL EVALUATION ADULT - GENERAL OBSERVATIONS, REHAB EVAL
8:15-8:45. chart reviewed. Pt received semi-saleem at B/S, alert, oriented, able to follow multi-step instructions and agreeable to PT evaluation.+ O2 2 L via NC, + lumbar drain clamped by nurse, + IV, + monitoring, -ve orthostatic, SPO2 93-94% t/o session, CC old R sciatic pain 9/10 nurse made aware. reported slight improved gait, NAD.

## 2023-06-07 NOTE — PROGRESS NOTE ADULT - SUBJECTIVE AND OBJECTIVE BOX
Surgery:        Placement of  Shunt     HPI  Procedure:  Idiopathic normal pressure hydrocephalus    Handoff    HTN (hypertension)    Insertion of lumbar drain    Kidney tumor (benign), right    Kidney, benign tumor, left    [id number]    [id number]      acetaminophen     Tablet .. 650 milliGRAM(s) Oral every 6 hours PRN  bisacodyl 5 milliGRAM(s) Oral every 12 hours PRN  ceFAZolin   IVPB 2000 milliGRAM(s) IV Intermittent every 8 hours  meperidine     Injectable 12.5 milliGRAM(s) IV Push once PRN  ondansetron Injectable 4 milliGRAM(s) IV Push once PRN  potassium chloride   Powder 20 milliEquivalent(s) Oral once  senna 2 Tablet(s) Oral at bedtime  sodium chloride 0.9%. 1000 milliLiter(s) IV Continuous <Continuous>    No Known Allergies      06-07    138  |  104  |  15  ----------------------------<  136<H>  3.8   |  24  |  1.0    Ca    9.1      07 Jun 2023 15:55    TPro  6.0  /  Alb  3.9  /  TBili  0.8  /  DBili  x   /  AST  16  /  ALT  18  /  AlkPhos  67  06-07    CBC Full  -  ( 07 Jun 2023 15:55 )  WBC Count : 7.76 K/uL  RBC Count : 5.26 M/uL  Hemoglobin : 15.6 g/dL  Hematocrit : 46.5 %  Platelet Count - Automated : 174 K/uL  Mean Cell Volume : 88.4 fL  Mean Cell Hemoglobin : 29.7 pg  Mean Cell Hemoglobin Concentration : 33.5 g/dL  Auto Neutrophil # : x  Auto Lymphocyte # : x  Auto Monocyte # : x  Auto Eosinophil # : x  Auto Basophil # : x  Auto Neutrophil % : x  Auto Lymphocyte % : x  Auto Monocyte % : x  Auto Eosinophil % : x  Auto Basophil % : x    PT/INR - ( 07 Jun 2023 15:55 )   PT: 11.80 sec;   INR: 1.03 ratio         PTT - ( 07 Jun 2023 15:55 )  PTT:38.1 sec    Type & Screen (in past 72hrs):Type + Screen (06.07.23 @ 15:55)   ABO RH Interpretation: O POS    CXR:   EKG:      Orders: NPO after midnight             Consent needs to be obtained

## 2023-06-07 NOTE — PROGRESS NOTE ADULT - SUBJECTIVE AND OBJECTIVE BOX
62M current smoker (1 ppd) with PMHx of HTN, benign kidney tumor s/p resection 2013, NPH presents s/p insertion of lumbar drain for NPH. Intraop was uneventful. Admitted to [facility] for further management.    Interval events: No acute events ON    ICU Vital Signs Last 24 Hrs  T(C): 36.8 (06 Jun 2023 18:26), Max: 36.8 (06 Jun 2023 18:26)  T(F): 98.2 (06 Jun 2023 18:26), Max: 98.2 (06 Jun 2023 18:26)  HR: 68 (06 Jun 2023 19:00) (66 - 77)  BP: 133/86 (06 Jun 2023 19:00) (118/69 - 165/77)  RR: 20 (06 Jun 2023 19:00) (15 - 21)  SpO2: 97% (06 Jun 2023 19:00) (97% - 98%)    O2 Parameters below as of 06 Jun 2023 19:00  Patient On (Oxygen Delivery Method): room air      06 Jun 2023 07:01  -  06 Jun 2023 20:42  --------------------------------------------------------  IN:  Total IN: 0 mL    OUT:    Drain (mL): 17 mL  Total OUT: 17 mL    Total NET: -17 mL        MEDICATIONS  (STANDING):  ceFAZolin   IVPB 2000 milliGRAM(s) IV Intermittent every 8 hours  lactated ringers. 1000 milliLiter(s) (100 mL/Hr) IV Continuous <Continuous>  senna 2 Tablet(s) Oral at bedtime  sodium chloride 0.9%. 1000 milliLiter(s) (75 mL/Hr) IV Continuous <Continuous>    MEDICATIONS  (PRN):  acetaminophen     Tablet .. 650 milliGRAM(s) Oral every 6 hours PRN Temp greater or equal to 38.5C (101.3F), Mild Pain (1 - 3)  bisacodyl 5 milliGRAM(s) Oral every 12 hours PRN Constipation  HYDROmorphone  Injectable 0.5 milliGRAM(s) IV Push every 10 minutes PRN Moderate Pain (4 - 6)  meperidine     Injectable 12.5 milliGRAM(s) IV Push once PRN Shivering  ondansetron Injectable 4 milliGRAM(s) IV Push once PRN Nausea and/or Vomiting  oxycodone    5 mG/acetaminophen 325 mG 2 Tablet(s) Oral once PRN Mild Pain (1 - 3)              PHYSICAL EXAM:  Constitutional: No Acute Distress   Reflexes: Deep Tendon Reflexes Intact   Pulmonary: Clear to Auscultation, No rales, No rhonchi, No wheezes   Cardiovascular: S1, S2, Regular rate and rhythm   Gastrointestinal: Soft, Non-tender, Non-distended   Extremities: No calf tenderness   Incision: clean and dry  Neurological: Awake, alert oriented to person, place and time, Following Commands, PERRL, EOMI, No Gaze Preference, Face Symmetrical, Speech Fluent, No dysmetria, No ataxia, No nystagmus

## 2023-06-07 NOTE — PHYSICAL THERAPY INITIAL EVALUATION ADULT - PERTINENT HX OF CURRENT PROBLEM, REHAB EVAL
62M current smoker (1 ppd) with PMHx of HTN, benign kidney tumor s/p resection 2013, NPH presents s/p insertion of lumbar drain for NPH.

## 2023-06-08 ENCOUNTER — TRANSCRIPTION ENCOUNTER (OUTPATIENT)
Age: 63
End: 2023-06-08

## 2023-06-08 VITALS
RESPIRATION RATE: 15 BRPM | HEART RATE: 93 BPM | SYSTOLIC BLOOD PRESSURE: 158 MMHG | DIASTOLIC BLOOD PRESSURE: 79 MMHG | OXYGEN SATURATION: 98 %

## 2023-06-08 LAB
ALBUMIN SERPL ELPH-MCNC: 3.9 G/DL — SIGNIFICANT CHANGE UP (ref 3.5–5.2)
ALP SERPL-CCNC: 74 U/L — SIGNIFICANT CHANGE UP (ref 30–115)
ALT FLD-CCNC: 16 U/L — SIGNIFICANT CHANGE UP (ref 0–41)
ANION GAP SERPL CALC-SCNC: 12 MMOL/L — SIGNIFICANT CHANGE UP (ref 7–14)
APPEARANCE CSF: CLEAR — SIGNIFICANT CHANGE UP
APTT BLD: 36.3 SEC — SIGNIFICANT CHANGE UP (ref 27–39.2)
AST SERPL-CCNC: 15 U/L — SIGNIFICANT CHANGE UP (ref 0–41)
BASOPHILS # BLD AUTO: 0.05 K/UL — SIGNIFICANT CHANGE UP (ref 0–0.2)
BASOPHILS NFR BLD AUTO: 0.7 % — SIGNIFICANT CHANGE UP (ref 0–1)
BILIRUB SERPL-MCNC: 0.7 MG/DL — SIGNIFICANT CHANGE UP (ref 0.2–1.2)
BUN SERPL-MCNC: 13 MG/DL — SIGNIFICANT CHANGE UP (ref 10–20)
CALCIUM SERPL-MCNC: 9.1 MG/DL — SIGNIFICANT CHANGE UP (ref 8.4–10.5)
CHLORIDE SERPL-SCNC: 107 MMOL/L — SIGNIFICANT CHANGE UP (ref 98–110)
CO2 SERPL-SCNC: 22 MMOL/L — SIGNIFICANT CHANGE UP (ref 17–32)
COLOR CSF: SIGNIFICANT CHANGE UP
CREAT SERPL-MCNC: 0.9 MG/DL — SIGNIFICANT CHANGE UP (ref 0.7–1.5)
EGFR: 95 ML/MIN/1.73M2 — SIGNIFICANT CHANGE UP
EOSINOPHIL # BLD AUTO: 0.2 K/UL — SIGNIFICANT CHANGE UP (ref 0–0.7)
EOSINOPHIL NFR BLD AUTO: 2.6 % — SIGNIFICANT CHANGE UP (ref 0–8)
GLUCOSE CSF-MCNC: 80 MG/DL — HIGH (ref 45–75)
GLUCOSE SERPL-MCNC: 112 MG/DL — HIGH (ref 70–99)
GRAM STN FLD: SIGNIFICANT CHANGE UP
HCT VFR BLD CALC: 46.4 % — SIGNIFICANT CHANGE UP (ref 42–52)
HGB BLD-MCNC: 16.3 G/DL — SIGNIFICANT CHANGE UP (ref 14–18)
IMM GRANULOCYTES NFR BLD AUTO: 0.5 % — HIGH (ref 0.1–0.3)
INR BLD: 1.02 RATIO — SIGNIFICANT CHANGE UP (ref 0.65–1.3)
LYMPHOCYTES # BLD AUTO: 1.77 K/UL — SIGNIFICANT CHANGE UP (ref 1.2–3.4)
LYMPHOCYTES # BLD AUTO: 23.4 % — SIGNIFICANT CHANGE UP (ref 20.5–51.1)
MAGNESIUM SERPL-MCNC: 1.9 MG/DL — SIGNIFICANT CHANGE UP (ref 1.8–2.4)
MCHC RBC-ENTMCNC: 30.5 PG — SIGNIFICANT CHANGE UP (ref 27–31)
MCHC RBC-ENTMCNC: 35.1 G/DL — SIGNIFICANT CHANGE UP (ref 32–37)
MCV RBC AUTO: 86.7 FL — SIGNIFICANT CHANGE UP (ref 80–94)
MONOCYTES # BLD AUTO: 0.6 K/UL — SIGNIFICANT CHANGE UP (ref 0.1–0.6)
MONOCYTES NFR BLD AUTO: 7.9 % — SIGNIFICANT CHANGE UP (ref 1.7–9.3)
NEUTROPHILS # BLD AUTO: 4.9 K/UL — SIGNIFICANT CHANGE UP (ref 1.4–6.5)
NEUTROPHILS # CSF: SIGNIFICANT CHANGE UP % (ref 0–6)
NEUTROPHILS NFR BLD AUTO: 64.9 % — SIGNIFICANT CHANGE UP (ref 42.2–75.2)
NRBC # BLD: 0 /100 WBCS — SIGNIFICANT CHANGE UP (ref 0–0)
NRBC NFR CSF: 0 /UL — SIGNIFICANT CHANGE UP (ref 0–5)
PHOSPHATE SERPL-MCNC: 2.7 MG/DL — SIGNIFICANT CHANGE UP (ref 2.1–4.9)
PLATELET # BLD AUTO: 187 K/UL — SIGNIFICANT CHANGE UP (ref 130–400)
PMV BLD: 10.3 FL — SIGNIFICANT CHANGE UP (ref 7.4–10.4)
POTASSIUM SERPL-MCNC: 3.7 MMOL/L — SIGNIFICANT CHANGE UP (ref 3.5–5)
POTASSIUM SERPL-SCNC: 3.7 MMOL/L — SIGNIFICANT CHANGE UP (ref 3.5–5)
PROT CSF-MCNC: 88 MG/DL — HIGH (ref 15–45)
PROT SERPL-MCNC: 6 G/DL — SIGNIFICANT CHANGE UP (ref 6–8)
PROTHROM AB SERPL-ACNC: 11.6 SEC — SIGNIFICANT CHANGE UP (ref 9.95–12.87)
RBC # BLD: 5.35 M/UL — SIGNIFICANT CHANGE UP (ref 4.7–6.1)
RBC # CSF: 79 /UL — SIGNIFICANT CHANGE UP (ref 0–0)
RBC # FLD: 14.2 % — SIGNIFICANT CHANGE UP (ref 11.5–14.5)
SODIUM SERPL-SCNC: 141 MMOL/L — SIGNIFICANT CHANGE UP (ref 135–146)
SPECIMEN SOURCE: SIGNIFICANT CHANGE UP
TUBE TYPE: SIGNIFICANT CHANGE UP
WBC # BLD: 7.56 K/UL — SIGNIFICANT CHANGE UP (ref 4.8–10.8)
WBC # FLD AUTO: 7.56 K/UL — SIGNIFICANT CHANGE UP (ref 4.8–10.8)

## 2023-06-08 RX ORDER — HYDRALAZINE HCL 50 MG
10 TABLET ORAL ONCE
Refills: 0 | Status: COMPLETED | OUTPATIENT
Start: 2023-06-08 | End: 2023-06-08

## 2023-06-08 RX ORDER — CYCLOBENZAPRINE HYDROCHLORIDE 10 MG/1
1 TABLET, FILM COATED ORAL
Qty: 42 | Refills: 0
Start: 2023-06-08 | End: 2023-06-21

## 2023-06-08 RX ORDER — GABAPENTIN 400 MG/1
300 CAPSULE ORAL EVERY 8 HOURS
Refills: 0 | Status: DISCONTINUED | OUTPATIENT
Start: 2023-06-08 | End: 2023-06-08

## 2023-06-08 RX ORDER — CYCLOBENZAPRINE HYDROCHLORIDE 10 MG/1
5 TABLET, FILM COATED ORAL THREE TIMES A DAY
Refills: 0 | Status: DISCONTINUED | OUTPATIENT
Start: 2023-06-08 | End: 2023-06-08

## 2023-06-08 RX ORDER — OXYCODONE HYDROCHLORIDE 5 MG/1
1 TABLET ORAL
Qty: 8 | Refills: 0
Start: 2023-06-08 | End: 2023-06-09

## 2023-06-08 RX ORDER — SENNA PLUS 8.6 MG/1
2 TABLET ORAL
Qty: 0 | Refills: 0 | DISCHARGE
Start: 2023-06-08

## 2023-06-08 RX ORDER — GABAPENTIN 400 MG/1
1 CAPSULE ORAL
Qty: 42 | Refills: 0
Start: 2023-06-08 | End: 2023-06-21

## 2023-06-08 RX ORDER — HYDROMORPHONE HYDROCHLORIDE 2 MG/ML
0.25 INJECTION INTRAMUSCULAR; INTRAVENOUS; SUBCUTANEOUS EVERY 4 HOURS
Refills: 0 | Status: DISCONTINUED | OUTPATIENT
Start: 2023-06-08 | End: 2023-06-08

## 2023-06-08 RX ORDER — ACETAMINOPHEN 500 MG
2 TABLET ORAL
Qty: 0 | Refills: 0 | DISCHARGE
Start: 2023-06-08

## 2023-06-08 RX ORDER — POTASSIUM CHLORIDE 20 MEQ
40 PACKET (EA) ORAL ONCE
Refills: 0 | Status: COMPLETED | OUTPATIENT
Start: 2023-06-08 | End: 2023-06-08

## 2023-06-08 RX ORDER — MAGNESIUM SULFATE 500 MG/ML
2 VIAL (ML) INJECTION ONCE
Refills: 0 | Status: COMPLETED | OUTPATIENT
Start: 2023-06-08 | End: 2023-06-08

## 2023-06-08 RX ORDER — LABETALOL HCL 100 MG
100 TABLET ORAL DAILY
Refills: 0 | Status: DISCONTINUED | OUTPATIENT
Start: 2023-06-08 | End: 2023-06-08

## 2023-06-08 RX ADMIN — Medication 650 MILLIGRAM(S): at 12:30

## 2023-06-08 RX ADMIN — Medication 650 MILLIGRAM(S): at 04:30

## 2023-06-08 RX ADMIN — Medication 650 MILLIGRAM(S): at 05:30

## 2023-06-08 RX ADMIN — GABAPENTIN 300 MILLIGRAM(S): 400 CAPSULE ORAL at 12:30

## 2023-06-08 RX ADMIN — HYDROMORPHONE HYDROCHLORIDE 0.25 MILLIGRAM(S): 2 INJECTION INTRAMUSCULAR; INTRAVENOUS; SUBCUTANEOUS at 04:45

## 2023-06-08 RX ADMIN — HYDROMORPHONE HYDROCHLORIDE 0.25 MILLIGRAM(S): 2 INJECTION INTRAMUSCULAR; INTRAVENOUS; SUBCUTANEOUS at 04:29

## 2023-06-08 RX ADMIN — Medication 40 MILLIEQUIVALENT(S): at 08:48

## 2023-06-08 RX ADMIN — Medication 10 MILLIGRAM(S): at 11:35

## 2023-06-08 RX ADMIN — Medication 10 MILLIGRAM(S): at 02:00

## 2023-06-08 RX ADMIN — Medication 100 MILLIGRAM(S): at 05:42

## 2023-06-08 RX ADMIN — Medication 25 GRAM(S): at 08:28

## 2023-06-08 NOTE — CHART NOTE - NSCHARTNOTEFT_GEN_A_CORE
Per Physical therapy evaluation, patient ambulated well 200ft with rolling walker on POD#1 and POD#2 for lumbar drain placement for normal pressure hydrocephalus. Preop symptoms included difficulty with ambulation. Patient has improved symptoms, but physical therapy recc  that patient would benefit to be dc'd home with rolling walker.

## 2023-06-08 NOTE — PROGRESS NOTE ADULT - SUBJECTIVE AND OBJECTIVE BOX
NEUROSURGERY NOTE  SAVANA NOBLE   06-08-23 @ 10:03    PAST 24HR EVENTS:  POD#2 s/p Placement of Lumbar Drain   Patient seen and examined at bedside. Per patient, states that ambulation has improved since LD has been placed - decision made to schedule VPS placement next week.   Pt ambulated well with physical therapy, states that he has R sided radicular pain similar to his sciatic nerve.     HPI: 62y Male     PHYSICAL EXAM:  Vital Signs Last 24 Hrs  T(C): 36.8 (08 Jun 2023 08:00), Max: 37.4 (07 Jun 2023 20:00)  T(F): 98.2 (08 Jun 2023 08:00), Max: 99.4 (07 Jun 2023 20:00)  HR: 88 (08 Jun 2023 08:00) (64 - 88)  BP: 148/90 (08 Jun 2023 08:00) (128/69 - 188/78)  BP(mean): 106 (08 Jun 2023 05:00) (106 - 121)  RR: 20 (08 Jun 2023 08:00) (14 - 20)  SpO2: 97% (08 Jun 2023 08:00) (94% - 97%)    Parameters below as of 08 Jun 2023 08:00  Patient On (Oxygen Delivery Method): room air      I&O's Detail    07 Jun 2023 07:01  -  08 Jun 2023 07:00  --------------------------------------------------------  IN:    IV PiggyBack: 50 mL    Oral Fluid: 930 mL    sodium chloride 0.9%: 1800 mL  Total IN: 2780 mL    OUT:    Drain (mL): 343 mL    Voided (mL): 2825 mL  Total OUT: 3168 mL    Total NET: -388 mL      08 Jun 2023 07:01  -  08 Jun 2023 10:03  --------------------------------------------------------  IN:    sodium chloride 0.9%: 75 mL  Total IN: 75 mL    OUT:    Drain (mL): 10 mL  Total OUT: 10 mL    Total NET: 65 mL        I&O's Summary    07 Jun 2023 07:01  -  08 Jun 2023 07:00  --------------------------------------------------------  IN: 2780 mL / OUT: 3168 mL / NET: -388 mL    08 Jun 2023 07:01  -  08 Jun 2023 10:03  --------------------------------------------------------  IN: 75 mL / OUT: 10 mL / NET: 65 mL    AOx3, appropriate, follows commands  PERRL, EOMI  PETERSON x 4 with good strength   Sensation intact to light touch   No protonator drift   Incision: C/D/I    MEDS:   acetaminophen     Tablet .. 650 milliGRAM(s) Oral every 6 hours PRN  bisacodyl 5 milliGRAM(s) Oral every 12 hours PRN  ceFAZolin   IVPB 2000 milliGRAM(s) IV Intermittent every 8 hours  HYDROmorphone  Injectable 0.25 milliGRAM(s) IV Push every 4 hours PRN  meperidine     Injectable 12.5 milliGRAM(s) IV Push once PRN  ondansetron Injectable 4 milliGRAM(s) IV Push once PRN  senna 2 Tablet(s) Oral at bedtime  sodium chloride 0.9%. 1000 milliLiter(s) IV Continuous <Continuous>      LABS:                        16.3   7.56  )-----------( 187      ( 08 Jun 2023 04:25 )             46.4     06-08    141  |  107  |  13  ----------------------------<  112<H>  3.7   |  22  |  0.9    Ca    9.1      08 Jun 2023 04:25  Phos  2.7     06-08  Mg     1.9     06-08    TPro  6.0  /  Alb  3.9  /  TBili  0.7  /  DBili  x   /  AST  15  /  ALT  16  /  AlkPhos  74  06-08    PT/INR - ( 08 Jun 2023 04:25 )   PT: 11.60 sec;   INR: 1.02 ratio         PTT - ( 08 Jun 2023 04:25 )  PTT:36.3 sec    RADIOLOGY:   < from: CT Head No Cont (06.07.23 @ 16:08) >    Findings:    There is moderate enlargement of the lateral, third and fourth ventricles   consistent with communicating hydrocephalus.    There is no acute intracranial hemorrhage, extra-axial fluid collection   or midline shift.  Gray-white matter differentiation is maintained.    The visualized paranasal sinuses and mastoids are clear.    IMPRESSION:    1.  [facility] protocol images available for neurosurgical review.    2.  Moderate enlargement of the lateral, third and fourth ventricles   consistent with communicating hydrocephalus.

## 2023-06-08 NOTE — OCCUPATIONAL THERAPY INITIAL EVALUATION ADULT - PERTINENT HX OF CURRENT PROBLEM, REHAB EVAL
62M current smoker (1 ppd) with PMHx of HTN, benign kidney tumor s/p resection 2013, NPH presents s/p insertion of lumbar drain for NPH. Intraop was uneventful. Admitted to [facility] for further management.

## 2023-06-08 NOTE — CHART NOTE - NSCHARTNOTEFT_GEN_A_CORE
DC Lumbar drain in sterile fashion. Placed (1) 3-0 Monocyrl suture in exit site. No complications post procedure.

## 2023-06-08 NOTE — OCCUPATIONAL THERAPY INITIAL EVALUATION ADULT - PHYSICAL ASSIST/NONPHYSICAL ASSIST, OT EVAL
pt educated on sitting for bathing, pt states he will have spouse assist or will spongebathe until returns for shunt placement/verbal cues/1 person assist

## 2023-06-08 NOTE — DISCHARGE NOTE NURSING/CASE MANAGEMENT/SOCIAL WORK - PATIENT PORTAL LINK FT
You can access the FollowMyHealth Patient Portal offered by [facility] by registering at the following website: [email or web address]. By joining [facility]’s FollowMyHealth portal, you will also be able to view your health information using other applications (apps) compatible with our system.

## 2023-06-08 NOTE — OCCUPATIONAL THERAPY INITIAL EVALUATION ADULT - NSOTDISCHREC_GEN_A_CORE
Pt requires assistance with ADLs and functional transfers, may benefit from home OT - however pt scheduled for  shunt placement Monday and will re-assess post

## 2023-06-08 NOTE — DISCHARGE NOTE PROVIDER - NSDCFUSCHEDAPPT_GEN_ALL_CORE_FT
[facility]  NEUROSURG [address] [place] [address]  Scheduled Appointment: 06/21/2023

## 2023-06-08 NOTE — PROGRESS NOTE ADULT - SUBJECTIVE AND OBJECTIVE BOX
62M current smoker (1 ppd) with PMHx of HTN, benign kidney tumor s/p resection 2013, NPH presents s/p insertion of lumbar drain for NPH. Intraop was uneventful. Admitted to [facility] for further management.    Interval events: No acute events ON    ICU Vital Signs Last 24 Hrs  T(C): 36.8 (06 Jun 2023 18:26), Max: 36.8 (06 Jun 2023 18:26)  T(F): 98.2 (06 Jun 2023 18:26), Max: 98.2 (06 Jun 2023 18:26)  HR: 68 (06 Jun 2023 19:00) (66 - 77)  BP: 133/86 (06 Jun 2023 19:00) (118/69 - 165/77)  RR: 20 (06 Jun 2023 19:00) (15 - 21)  SpO2: 97% (06 Jun 2023 19:00) (97% - 98%)    O2 Parameters below as of 06 Jun 2023 19:00  Patient On (Oxygen Delivery Method): room air      06 Jun 2023 07:01  -  06 Jun 2023 20:42  --------------------------------------------------------  IN:  Total IN: 0 mL    OUT:    Drain (mL): 17 mL  Total OUT: 17 mL    Total NET: -17 mL        MEDICATIONS  (STANDING):  ceFAZolin   IVPB 2000 milliGRAM(s) IV Intermittent every 8 hours  lactated ringers. 1000 milliLiter(s) (100 mL/Hr) IV Continuous <Continuous>  senna 2 Tablet(s) Oral at bedtime  sodium chloride 0.9%. 1000 milliLiter(s) (75 mL/Hr) IV Continuous <Continuous>    MEDICATIONS  (PRN):  acetaminophen     Tablet .. 650 milliGRAM(s) Oral every 6 hours PRN Temp greater or equal to 38.5C (101.3F), Mild Pain (1 - 3)  bisacodyl 5 milliGRAM(s) Oral every 12 hours PRN Constipation  HYDROmorphone  Injectable 0.5 milliGRAM(s) IV Push every 10 minutes PRN Moderate Pain (4 - 6)  meperidine     Injectable 12.5 milliGRAM(s) IV Push once PRN Shivering  ondansetron Injectable 4 milliGRAM(s) IV Push once PRN Nausea and/or Vomiting  oxycodone    5 mG/acetaminophen 325 mG 2 Tablet(s) Oral once PRN Mild Pain (1 - 3)              PHYSICAL EXAM:  Constitutional: No Acute Distress   Reflexes: Deep Tendon Reflexes Intact   Pulmonary: Clear to Auscultation, No rales, No rhonchi, No wheezes   Cardiovascular: S1, S2, Regular rate and rhythm   Gastrointestinal: Soft, Non-tender, Non-distended   Extremities: No calf tenderness   Incision: clean and dry  Neurological: Awake, alert oriented to person, place and time, Following Commands, PERRL, EOMI, No Gaze Preference, Face Symmetrical, Speech Fluent, No dysmetria, No ataxia, No nystagmus          62M current smoker (1 ppd) with PMHx of HTN, benign kidney tumor s/p resection 2013, NPH presents s/p insertion of lumbar drain for NPH. Intraop was uneventful. Admitted to [facility] for further management.    Interval events: No acute events ON    ICU Vital Signs Last 24 Hrs  T(C): 36.7 (08 Jun 2023 04:00), Max: 37.4 (07 Jun 2023 20:00)  T(F): 98 (08 Jun 2023 04:00), Max: 99.4 (07 Jun 2023 20:00)  HR: 79 (08 Jun 2023 07:00) (64 - 80)  BP: 135/79 (08 Jun 2023 07:00) (128/69 - 188/78)  BP(mean): 106 (08 Jun 2023 05:00) (106 - 121)  ABP: --  ABP(mean): --  RR: 19 (08 Jun 2023 07:00) (14 - 20)  SpO2: 96% (08 Jun 2023 07:00) (94% - 97%)      06-07-23 @ 07:01  -  06-08-23 @ 07:00  --------------------------------------------------------  IN: 2780 mL / OUT: 3168 mL / NET: -388 mL          LABS:  Na: 141 (06-08 @ 04:25), 138 (06-07 @ 15:55), 139 (06-07 @ 04:45)  K: 3.7 (06-08 @ 04:25), 3.8 (06-07 @ 15:55), 3.7 (06-07 @ 04:45)  Cl: 107 (06-08 @ 04:25), 104 (06-07 @ 15:55), 103 (06-07 @ 04:45)  CO2: 22 (06-08 @ 04:25), 24 (06-07 @ 15:55), 22 (06-07 @ 04:45)  BUN: 13 (06-08 @ 04:25), 15 (06-07 @ 15:55), 14 (06-07 @ 04:45)  Cr: 0.9 (06-08 @ 04:25), 1.0 (06-07 @ 15:55), 0.9 (06-07 @ 04:45)  Glu: 112(06-08 @ 04:25), 136(06-07 @ 15:55), 117(06-07 @ 04:45)    Hgb: 16.3 (06-08 @ 04:25), 15.6 (06-07 @ 15:55), 15.9 (06-07 @ 04:45)  Hct: 46.4 (06-08 @ 04:25), 46.5 (06-07 @ 15:55), 46.4 (06-07 @ 04:45)  WBC: 7.56 (06-08 @ 04:25), 7.76 (06-07 @ 15:55), 8.59 (06-07 @ 04:45)  Plt: 187 (06-08 @ 04:25), 174 (06-07 @ 15:55), 175 (06-07 @ 04:45)    INR: 1.02 06-08-23 @ 04:25, 1.03 06-07-23 @ 15:55  PTT: 36.3 06-08-23 @ 04:25, 38.1 06-07-23 @ 15:55          LIVER FUNCTIONS - ( 08 Jun 2023 04:25 )  Alb: 3.9 g/dL / Pro: 6.0 g/dL / ALK PHOS: 74 U/L / ALT: 16 U/L / AST: 15 U/L / GGT: x                 MEDICATIONS  (STANDING):  ceFAZolin   IVPB 2000 milliGRAM(s) IV Intermittent every 8 hours  magnesium sulfate  IVPB 2 Gram(s) IV Intermittent once  potassium chloride   Powder 40 milliEquivalent(s) Oral once  senna 2 Tablet(s) Oral at bedtime  sodium chloride 0.9%. 1000 milliLiter(s) (75 mL/Hr) IV Continuous <Continuous>    MEDICATIONS  (PRN):  acetaminophen     Tablet .. 650 milliGRAM(s) Oral every 6 hours PRN Temp greater or equal to 38.5C (101.3F), Mild Pain (1 - 3)  bisacodyl 5 milliGRAM(s) Oral every 12 hours PRN Constipation  HYDROmorphone  Injectable 0.25 milliGRAM(s) IV Push every 4 hours PRN Moderate Pain (4 - 6)  meperidine     Injectable 12.5 milliGRAM(s) IV Push once PRN Shivering  ondansetron Injectable 4 milliGRAM(s) IV Push once PRN Nausea and/or Vomiting            PHYSICAL EXAM:  Constitutional: No Acute Distress   Reflexes: Deep Tendon Reflexes Intact   Pulmonary: Clear to Auscultation, No rales, No rhonchi, No wheezes   Cardiovascular: S1, S2, Regular rate and rhythm   Gastrointestinal: Soft, Non-tender, Non-distended   Extremities: No calf tenderness   Incision: clean and dry  Neurological: Awake, alert oriented to person, place and time, Following Commands, PERRL, EOMI, No Gaze Preference, Face Symmetrical, Speech Fluent, No dysmetria, No ataxia, No nystagmus

## 2023-06-08 NOTE — DISCHARGE NOTE PROVIDER - CARE PROVIDER_API CALL
Christy Garza  Neurosurgery  [address] [place] [address], [address] [address]  Phone: [phone]  Fax: [phone]  Follow Up Time: 2 weeks

## 2023-06-08 NOTE — OCCUPATIONAL THERAPY INITIAL EVALUATION ADULT - GENERAL OBSERVATIONS, REHAB EVAL
Pt received seated in b/s chair in NAD, +tele, +lumbar drain (clamped), +BP cuff, +pulse oxi, RN at bedside, left seated in b/s chair in NAD, vitals stable

## 2023-06-08 NOTE — DISCHARGE NOTE PROVIDER - HOSPITAL COURSE
62M current smoker (1 ppd) with PMHx of HTN, benign kidney tumor s/p resection 2013, NPH presenting for Lumbar Drain placement with Dr. Garza 6/6/2023.   Patient did well doing post-procedure and was extubated in the OR. Patient was transferred to the PACU. Lumbar Drain was set to drain 10cc/hr. Patient worked with physical therapy and per patient, had significant improvement in ambulation with lumbar drain placement. Patient was started on DVT ppx. Lumbar Drain was dc'd on POD#2 without any complications. Patient is doing well, and patient is cleared for discharge and is to follow up with Dr. Garza for Placement of VPS next week. 62M current smoker (1 ppd) with PMHx of HTN, benign kidney tumor s/p resection 2013, NPH presenting for Lumbar Drain placement with Dr. Garza 6/6/2023.   Patient did well doing post-procedure and was extubated in the OR. Patient was transferred to the PACU. Lumbar Drain was set to drain 10cc/hr. Patient worked with physical therapy and per patient, had significant improvement in ambulation with lumbar drain placement. Lumbar Drain was dc'd on POD#2 without any complications. Patient is doing well, and patient is cleared for discharge and is to follow up with Dr. Garza for Placement of VPS next week, tentatively for Monday 6/12/2023

## 2023-06-08 NOTE — PROGRESS NOTE ADULT - ASSESSMENT
62 M NPH, HTN, benign kidney tumor s/p resection 2013 s/p LD     PLAN   - DC home today with RW   - Will schedule VPS placement for next week with Dr. Garza   - Discussed case with attending

## 2023-06-08 NOTE — DISCHARGE NOTE PROVIDER - NSDCFUADDINST_GEN_ALL_CORE_FT
- Upon discharge,  please call to schedule a follow up with Dr. Garza in 1-2 weeks.  - Upon discharge, please call to make a follow up appointment with your primary care provider to discuss your recent hospitalization/operation.  - Keep dressings dry for 48 hours after which you may remove dressing and cleanse with soap and water in the shower (no scrubbing). Leave the steri strips (white tape) on your incisions, they will fall off on their own. Run water and soap over incision sites and pat dry (no scrubbing). No submerging your incision sites in water (i.e. no swimming or baths) for 2-3 weeks and avoid exposing the area to jets/streams of water.   - You can resume your normal activities as tolerated, but avoid heavy (>15lb.) lifting and strenuous exercise for 4-6 weeks.    - ***You were prescribed narcotic pain medications, take these only as needed.  Your pain should subside over the next few days.  While taking narcotic pain medications, you should not drive or operate heavy machinery.  If you were prescribed Percocet (oxycodone-acetaminophen) and are taking it with other medications containing acetaminophen (Tylenol), reduce your dose of percocet so that you  do not exceed 3,000 mg of acetaminophen per day.  - Narcotic pain medicine tends to cause constipation, you have can take an over-the-counter stool softener 3 times a day to help prevent that. Hold the stool softener if you start to have loose stools.  - If you experience fevers, chills, increasing abdominal pain, nausea, vomiting, inability to pass stool or gas, bleeding, or any other acute symptoms, please call your doctor and report to the emergency room immediately for further management.

## 2023-06-08 NOTE — DISCHARGE NOTE PROVIDER - NSDCCPCAREPLAN_GEN_ALL_CORE_FT
PRINCIPAL DISCHARGE DIAGNOSIS  Diagnosis: NPH (normal pressure hydrocephalus)  Assessment and Plan of Treatment:

## 2023-06-08 NOTE — PROGRESS NOTE ADULT - ASSESSMENT
62M current smoker (1 ppd) with PMHx of HTN, benign kidney tumor s/p resection 2013, NPH presents s/p insertion of lumbar drain for NPH.      Plan    Neurological:    - Neuro checks Q1  - Monitor drain output- remove 10- 12 cc/hr and clamp after achieving goal   - Analgesia: Tylenol/Tramadol 25Q6, Dilaudid PRN  - Neurosx following  - Activity: Bedrest  - OT/PT    Cardiovascular:  - Keep MAP > 65 and SBP <140  - EKG - pending  - Restart home AntiHTN      Pulmonary:  - HOB 30  - Maintain SaO2 > 92%  - incentive spirometry    GI:  - Diet: Regular  - GI ppx- not indicated  - Bowel Regimen: Senna/Miralax     :  - IVF  - Strict Is/Os  - Keep normonatremic  - Maintain Mg > 2, K >4, Phos> 3  - Voiding freely    Endo:  - Keep euglycemia    Hematology:  - SCDs    ID:  - Afebrile  - procedural Ancef      Code Status: Full   62M current smoker (1 ppd) with PMHx of HTN, benign kidney tumor s/p resection 2013, NPH presents s/p insertion of lumbar drain for NPH.      Plan    Neurological:    - Neuro checks Q4  - Monitor drain output- remove 10- 12 cc/hr and clamp after achieving goal --> dc today and plan for vps with nsgy next week  - Analgesia: Tylenol/Tramadol 25Q6, Dilaudid PRN  - Neurosx following  - Activity: oobtc  - OT/PT    Cardiovascular:  - Keep MAP > 65 and SBP <140  - Restart home AntiHTN      Pulmonary:  - HOB 30  - Maintain SaO2 > 92%  - incentive spirometry    GI:  - Diet: Regular  - GI ppx- not indicated  - Bowel Regimen: Senna/Miralax     :  - IVL  - Strict Is/Os  - Keep normonatremic  - Maintain Mg > 2, K >4, Phos> 3  - Voiding freely    Endo:  - Keep euglycemia    Hematology:  - SCDs  chemical ppx per nsgy    ID:  - Afebrile  - procedural Ancef completed      Code Status: Full      dispo- floor vs home

## 2023-06-08 NOTE — DISCHARGE NOTE PROVIDER - NSDCMRMEDTOKEN_GEN_ALL_CORE_FT
acetaminophen 325 mg oral tablet: 2 tab(s) orally every 6 hours As needed Temp greater or equal to 38.5C (101.3F), Mild Pain (1 - 3)  amlodipine-atorvastatin 10 mg-40 mg oral tablet: 1 orally once a day  labetalol 100 mg oral tablet: 1 orally once a day  Protonix 40 mg oral delayed release tablet: 1 orally once a day  senna leaf extract oral tablet: 2 tab(s) orally once a day (at bedtime)  Uloric 80 mg oral tablet: 1 orally once a day   acetaminophen 325 mg oral tablet: 2 tab(s) orally every 6 hours As needed Temp greater or equal to 38.5C (101.3F), Mild Pain (1 - 3)  amlodipine-atorvastatin 10 mg-40 mg oral tablet: 1 orally once a day  cyclobenzaprine 10 mg oral tablet: 1 tab(s) orally every 8 hours  gabapentin 300 mg oral capsule: 1 cap(s) orally every 8 hours  labetalol 100 mg oral tablet: 1 orally once a day  oxyCODONE 5 mg oral tablet: 1 tab(s) orally every 6 hours MDD: 4  Protonix 40 mg oral delayed release tablet: 1 orally once a day  senna leaf extract oral tablet: 2 tab(s) orally once a day (at bedtime)  Uloric 80 mg oral tablet: 1 orally once a day

## 2023-06-08 NOTE — OCCUPATIONAL THERAPY INITIAL EVALUATION ADULT - ADL RETRAINING, OT EVAL
Patient will perform lower body dressing independently with use of appropriate adaptive equipment as needed by discharge. Patient will perform bathing independently with use of appropriate adaptive equipment and/or DME by discharge.

## 2023-06-09 PROBLEM — I10 ESSENTIAL (PRIMARY) HYPERTENSION: Chronic | Status: ACTIVE | Noted: 2023-06-06

## 2023-06-09 LAB
CSF PCR RESULT: SIGNIFICANT CHANGE UP
LDH CSF L TO P-CCNC: 22 U/L — SIGNIFICANT CHANGE UP
LDH FLD-CCNC: 22 U/L — SIGNIFICANT CHANGE UP
NIGHT BLUE STAIN TISS: SIGNIFICANT CHANGE UP
SPECIMEN SOURCE: SIGNIFICANT CHANGE UP

## 2023-06-09 NOTE — ASU PATIENT PROFILE, ADULT - FALL HARM RISK - UNIVERSAL INTERVENTIONS
Bed in lowest position, wheels locked, appropriate side rails in place/Call bell, personal items and telephone in reach/Instruct patient to call for assistance before getting out of bed or chair/Non-slip footwear when patient is out of bed/[place] to call system/Physically safe environment - no spills, clutter or unnecessary equipment/Purposeful Proactive Rounding/Room/bathroom lighting operational, light cord in reach

## 2023-06-11 LAB
CULTURE RESULTS: NO GROWTH — SIGNIFICANT CHANGE UP
SPECIMEN SOURCE: SIGNIFICANT CHANGE UP

## 2023-06-12 ENCOUNTER — APPOINTMENT (OUTPATIENT)
Dept: NEUROSURGERY | Facility: CLINIC | Age: 63
End: 2023-06-12

## 2023-06-12 ENCOUNTER — INPATIENT (INPATIENT)
Facility: HOSPITAL | Age: 63
LOS: 0 days | Discharge: HOME | DRG: 33 | End: 2023-06-13
Attending: NEUROLOGICAL SURGERY | Admitting: NEUROLOGICAL SURGERY
Payer: COMMERCIAL

## 2023-06-12 VITALS
TEMPERATURE: 99 F | SYSTOLIC BLOOD PRESSURE: 173 MMHG | HEIGHT: 64 IN | OXYGEN SATURATION: 97 % | HEART RATE: 71 BPM | WEIGHT: 220.9 LBS | RESPIRATION RATE: 18 BRPM | DIASTOLIC BLOOD PRESSURE: 83 MMHG

## 2023-06-12 DIAGNOSIS — G91.2 (IDIOPATHIC) NORMAL PRESSURE HYDROCEPHALUS: ICD-10-CM

## 2023-06-12 DIAGNOSIS — I10 ESSENTIAL (PRIMARY) HYPERTENSION: ICD-10-CM

## 2023-06-12 DIAGNOSIS — E83.42 HYPOMAGNESEMIA: ICD-10-CM

## 2023-06-12 DIAGNOSIS — F17.210 NICOTINE DEPENDENCE, CIGARETTES, UNCOMPLICATED: ICD-10-CM

## 2023-06-12 DIAGNOSIS — R32 UNSPECIFIED URINARY INCONTINENCE: ICD-10-CM

## 2023-06-12 DIAGNOSIS — D30.02 BENIGN NEOPLASM OF LEFT KIDNEY: Chronic | ICD-10-CM

## 2023-06-12 DIAGNOSIS — G93.89 OTHER SPECIFIED DISORDERS OF BRAIN: ICD-10-CM

## 2023-06-12 PROCEDURE — C9399: CPT

## 2023-06-12 PROCEDURE — 70450 CT HEAD/BRAIN W/O DYE: CPT

## 2023-06-12 PROCEDURE — 70450 CT HEAD/BRAIN W/O DYE: CPT | Mod: 26

## 2023-06-12 PROCEDURE — 97162 PT EVAL MOD COMPLEX 30 MIN: CPT | Mod: GP

## 2023-06-12 PROCEDURE — 61781 SCAN PROC CRANIAL INTRA: CPT

## 2023-06-12 PROCEDURE — C1713: CPT

## 2023-06-12 PROCEDURE — C1889: CPT

## 2023-06-12 PROCEDURE — C1892: CPT

## 2023-06-12 PROCEDURE — 62223 ESTABLISH BRAIN CAVITY SHUNT: CPT | Mod: 62

## 2023-06-12 RX ORDER — GABAPENTIN 400 MG/1
300 CAPSULE ORAL EVERY 8 HOURS
Refills: 0 | Status: DISCONTINUED | OUTPATIENT
Start: 2023-06-12 | End: 2023-06-13

## 2023-06-12 RX ORDER — LABETALOL HCL 100 MG
1 TABLET ORAL
Refills: 0 | DISCHARGE

## 2023-06-12 RX ORDER — SENNA PLUS 8.6 MG/1
2 TABLET ORAL AT BEDTIME
Refills: 0 | Status: DISCONTINUED | OUTPATIENT
Start: 2023-06-12 | End: 2023-06-13

## 2023-06-12 RX ORDER — ATORVASTATIN CALCIUM 80 MG/1
40 TABLET, FILM COATED ORAL AT BEDTIME
Refills: 0 | Status: DISCONTINUED | OUTPATIENT
Start: 2023-06-12 | End: 2023-06-13

## 2023-06-12 RX ORDER — MEPERIDINE HYDROCHLORIDE 50 MG/ML
12.5 INJECTION INTRAMUSCULAR; INTRAVENOUS; SUBCUTANEOUS
Refills: 0 | Status: DISCONTINUED | OUTPATIENT
Start: 2023-06-12 | End: 2023-06-12

## 2023-06-12 RX ORDER — HYDROMORPHONE HYDROCHLORIDE 2 MG/ML
0.5 INJECTION INTRAMUSCULAR; INTRAVENOUS; SUBCUTANEOUS
Refills: 0 | Status: DISCONTINUED | OUTPATIENT
Start: 2023-06-12 | End: 2023-06-12

## 2023-06-12 RX ORDER — ONDANSETRON 8 MG/1
4 TABLET, FILM COATED ORAL ONCE
Refills: 0 | Status: DISCONTINUED | OUTPATIENT
Start: 2023-06-12 | End: 2023-06-12

## 2023-06-12 RX ORDER — LEVETIRACETAM 250 MG/1
500 TABLET, FILM COATED ORAL EVERY 12 HOURS
Refills: 0 | Status: DISCONTINUED | OUTPATIENT
Start: 2023-06-12 | End: 2023-06-13

## 2023-06-12 RX ORDER — SODIUM CHLORIDE 9 MG/ML
1000 INJECTION, SOLUTION INTRAVENOUS
Refills: 0 | Status: DISCONTINUED | OUTPATIENT
Start: 2023-06-12 | End: 2023-06-12

## 2023-06-12 RX ORDER — AMLODIPINE BESYLATE 2.5 MG/1
10 TABLET ORAL DAILY
Refills: 0 | Status: DISCONTINUED | OUTPATIENT
Start: 2023-06-12 | End: 2023-06-13

## 2023-06-12 RX ORDER — OXYCODONE HYDROCHLORIDE 5 MG/1
5 TABLET ORAL EVERY 4 HOURS
Refills: 0 | Status: DISCONTINUED | OUTPATIENT
Start: 2023-06-12 | End: 2023-06-12

## 2023-06-12 RX ORDER — OXYCODONE HYDROCHLORIDE 5 MG/1
5 TABLET ORAL EVERY 6 HOURS
Refills: 0 | Status: DISCONTINUED | OUTPATIENT
Start: 2023-06-12 | End: 2023-06-13

## 2023-06-12 RX ORDER — METHOCARBAMOL 500 MG/1
500 TABLET, FILM COATED ORAL EVERY 8 HOURS
Refills: 0 | Status: DISCONTINUED | OUTPATIENT
Start: 2023-06-12 | End: 2023-06-12

## 2023-06-12 RX ORDER — LABETALOL HCL 100 MG
100 TABLET ORAL DAILY
Refills: 0 | Status: DISCONTINUED | OUTPATIENT
Start: 2023-06-12 | End: 2023-06-13

## 2023-06-12 RX ORDER — PANTOPRAZOLE SODIUM 20 MG/1
40 TABLET, DELAYED RELEASE ORAL
Refills: 0 | Status: DISCONTINUED | OUTPATIENT
Start: 2023-06-12 | End: 2023-06-13

## 2023-06-12 RX ORDER — FEBUXOSTAT 40 MG/1
1 TABLET ORAL
Refills: 0 | DISCHARGE

## 2023-06-12 RX ORDER — AMLODIPINE AND ATORVASTATIN 5; 20 MG/1; MG/1
1 TABLET, FILM COATED ORAL
Refills: 0 | DISCHARGE

## 2023-06-12 RX ORDER — HYDROMORPHONE HYDROCHLORIDE 2 MG/ML
1 INJECTION INTRAMUSCULAR; INTRAVENOUS; SUBCUTANEOUS
Refills: 0 | Status: DISCONTINUED | OUTPATIENT
Start: 2023-06-12 | End: 2023-06-12

## 2023-06-12 RX ORDER — CYCLOBENZAPRINE HYDROCHLORIDE 10 MG/1
10 TABLET, FILM COATED ORAL THREE TIMES A DAY
Refills: 0 | Status: DISCONTINUED | OUTPATIENT
Start: 2023-06-12 | End: 2023-06-13

## 2023-06-12 RX ORDER — MORPHINE SULFATE 50 MG/1
2 CAPSULE, EXTENDED RELEASE ORAL EVERY 4 HOURS
Refills: 0 | Status: DISCONTINUED | OUTPATIENT
Start: 2023-06-12 | End: 2023-06-13

## 2023-06-12 RX ORDER — PANTOPRAZOLE SODIUM 20 MG/1
1 TABLET, DELAYED RELEASE ORAL
Refills: 0 | DISCHARGE

## 2023-06-12 RX ORDER — ACETAMINOPHEN 500 MG
650 TABLET ORAL EVERY 6 HOURS
Refills: 0 | Status: DISCONTINUED | OUTPATIENT
Start: 2023-06-12 | End: 2023-06-13

## 2023-06-12 RX ADMIN — MORPHINE SULFATE 2 MILLIGRAM(S): 50 CAPSULE, EXTENDED RELEASE ORAL at 19:03

## 2023-06-12 RX ADMIN — ATORVASTATIN CALCIUM 40 MILLIGRAM(S): 80 TABLET, FILM COATED ORAL at 22:06

## 2023-06-12 RX ADMIN — OXYCODONE HYDROCHLORIDE 5 MILLIGRAM(S): 5 TABLET ORAL at 22:06

## 2023-06-12 RX ADMIN — LEVETIRACETAM 400 MILLIGRAM(S): 250 TABLET, FILM COATED ORAL at 18:41

## 2023-06-12 RX ADMIN — SENNA PLUS 2 TABLET(S): 8.6 TABLET ORAL at 22:06

## 2023-06-12 RX ADMIN — OXYCODONE HYDROCHLORIDE 5 MILLIGRAM(S): 5 TABLET ORAL at 22:36

## 2023-06-12 RX ADMIN — MORPHINE SULFATE 2 MILLIGRAM(S): 50 CAPSULE, EXTENDED RELEASE ORAL at 19:18

## 2023-06-12 RX ADMIN — GABAPENTIN 300 MILLIGRAM(S): 400 CAPSULE ORAL at 22:06

## 2023-06-12 NOTE — H&P ADULT - NSCORESITESY/N_GEN_A_CORE_RD
No
CONSTITUTIONAL: Well-appearing; in no apparent distress.   HEAD: Normocephalic; atraumatic.   EYES: Pupils are round and reactive, extra-ocular muscles are intact. Eyelids are normal in appearance without swelling or lesions.   ENT: Hearing is intact with good acuity to spoken voice. Patient is speaking clearly, not muffled and airway is intact.   RESPIRATORY: No signs of respiratory distress. Lung sounds are clear in all lobes bilaterally without rales, rhonchi, or wheezes.  CARDIOVASCULAR: Regular rate and rhythm.   GI: Abdomen is soft, non-tender, and without distention.   NEURO: A & O x 3. Normal speech. Visual fields are full to confrontation. Pupils are equally reactive to light. Extraocular movement is intact. There is no eye deviation. Facial sensation is intact to light touch. Face is symmetric with normal eye closure and smile. Hearing is normal to spoken voice. Phonation is normal. Head turning and shoulder shrug are intact. R upper extremity drift noticed.  PSYCHOLOGICAL: Appropriate mood and affect. Good judgement and insight.

## 2023-06-12 NOTE — PATIENT PROFILE ADULT - NSPROHMSYMPCOND_GEN_A_NUR
cardiovascular Terbinafine Counseling: Patient counseling regarding adverse effects of terbinafine including but not limited to headache, diarrhea, rash, upset stomach, liver function test abnormalities, itching, taste/smell disturbance, nausea, abdominal pain, and flatulence.  There is a rare possibility of liver failure that can occur when taking terbinafine.  The patient understands that a baseline LFT and kidney function test may be required. The patient verbalized understanding of the proper use and possible adverse effects of terbinafine.  All of the patient's questions and concerns were addressed.

## 2023-06-12 NOTE — PATIENT PROFILE ADULT - FALL HARM RISK - RISK INTERVENTIONS
Assistance OOB with selected safe patient handling equipment/Assistance with ambulation/Communicate Fall Risk and Risk Factors to all staff, patient, and family/Discuss with provider need for PT consult/Monitor for mental status changes/Monitor gait and stability/Move patient closer to nurses' station/Provide patient with walking aids - walker, cane, crutches/Reinforce activity limits and safety measures with patient and family/Reorient to person, place and time as needed/Review medications for side effects contributing to fall risk/Sit up slowly, dangle for a short time, stand at bedside before walking/Toileting schedule using arm’s reach rule for commode and bathroom/Use of alarms - bed, chair and/or voice tab/Visual Cue: Yellow wristband/Bed in lowest position, wheels locked, appropriate side rails in place/Call bell, personal items and telephone in reach/Instruct patient to call for assistance before getting out of bed or chair/Non-slip footwear when patient is out of bed/[place] to call system/Physically safe environment - no spills, clutter or unnecessary equipment/Purposeful Proactive Rounding/Room/bathroom lighting operational, light cord in reach

## 2023-06-12 NOTE — PATIENT PROFILE ADULT - PATIENT'S GENDER IDENTITY
Past Medical History:   Diagnosis Date    Basal cell carcinoma     Dementia      Past Surgical History:   Procedure Laterality Date    APPENDECTOMY      INSERTION OF INTRAMEDULLARY ZAYNAB Left 1/2/2020    Procedure: INSERTION, INTRAMEDULLARY ZAYNAB;  Surgeon: Adithya Addison MD;  Location: [facility] OR [address] [place] [address];  Service: Orthopedics;  Laterality: Left;    TONSILLECTOMY       History reviewed. No pertinent family history.  Social History     Tobacco Use    Smoking status: Never    Smokeless tobacco: Never   Substance Use Topics    Alcohol use: Not Currently    Drug use: Never     Review of patient's allergies indicates:  No Known Allergies    Medications: I have reviewed the current medication administration record.    (Not in a hospital admission)      Review of Systems   Constitutional:  Negative for activity change and fatigue.   HENT:  Negative for drooling and trouble swallowing.    Eyes:  Negative for photophobia and visual disturbance.   Respiratory:  Negative for cough and shortness of breath.    Cardiovascular:  Negative for chest pain and palpitations.   Gastrointestinal:  Negative for nausea and vomiting.   Musculoskeletal:  Negative for neck pain and neck stiffness.   Skin:  Negative for rash and wound.   Neurological:  Positive for speech difficulty and weakness. Negative for facial asymmetry and headaches.   Psychiatric/Behavioral:  Negative for confusion. The patient is not nervous/anxious.    Objective:     Vital Signs (Most Recent):  Temp: 97.7 °F (36.5 °C) (12/29/22 1606)  Pulse: 75 (12/29/22 1719)  Resp: 16 (12/29/22 1655)  BP: (!) 178/86 (12/29/22 1719)  SpO2: 99 % (12/29/22 1719)    Vital Signs Range (Last 24H):  Temp:  [97.7 °F (36.5 °C)]   Pulse:  [68-77]   Resp:  [16]   BP: (162-180)/()   SpO2:  [96 %-99 %]     Physical Exam  Constitutional:       General: She is not in acute distress.  HENT:      Head: Normocephalic.   Eyes:      Extraocular Movements: Extraocular movements intact.    Cardiovascular:      Rate and Rhythm: Normal rate.   Pulmonary:      Effort: Pulmonary effort is normal.   Abdominal:      General: Abdomen is flat.   Musculoskeletal:         General: Normal range of motion.   Skin:     General: Skin is warm and dry.   Neurological:      Mental Status: She is alert and oriented to person, place, and time.      Cranial Nerves: No dysarthria or facial asymmetry.      Motor: Weakness present.      Coordination: Finger-Nose-Finger Test normal.   Psychiatric:         Mood and Affect: Mood normal.         Speech: Speech is delayed.       Neurological Exam:   LOC: alert  Attention Span: Good   Language: No aphasia  Articulation: delayed speech  Orientation: Person, Place, Time   Visual Fields: Full  EOM (CN III, IV, VI): Full/intact  Facial Movement (CN VII): Symmetric facial expression    Motor: Arm left  Normal 5/5  Leg left  Paresis: 4/5  Arm right  Normal 5/5  Leg right Paresis: 4/5  Cerebellum: No evidence of appendicular or axial ataxia  Sensation: Intact to light touch, temperature and vibration      Laboratory:  CMP: No results for input(s): GLUCOSE, CALCIUM, ALBUMIN, PROT, NA, K, CO2, CL, BUN, CREATININE, ALKPHOS, ALT, AST, BILITOT in the last 24 hours.  CBC: No results for input(s): WBC, RBC, HGB, HCT, PLT, MCV, MCH, MCHC in the last 168 hours.  Lipid Panel: No results for input(s): CHOL, LDLCALC, HDL, TRIG in the last 168 hours.  Coagulation:   Recent Labs   Lab 12/29/22  1640   INR 1.3*   APTT 21.0     Hgb A1C: No results for input(s): HGBA1C in the last 168 hours.  TSH: No results for input(s): TSH in the last 168 hours.    Diagnostic Results:      Brain imaging:  CTA Stroke MP- no LVO seen pending final radiology read.     Male

## 2023-06-13 ENCOUNTER — TRANSCRIPTION ENCOUNTER (OUTPATIENT)
Age: 63
End: 2023-06-13

## 2023-06-13 VITALS
SYSTOLIC BLOOD PRESSURE: 140 MMHG | RESPIRATION RATE: 18 BRPM | TEMPERATURE: 98 F | HEART RATE: 80 BPM | OXYGEN SATURATION: 97 % | DIASTOLIC BLOOD PRESSURE: 70 MMHG

## 2023-06-13 RX ORDER — LABETALOL HCL 100 MG
10 TABLET ORAL ONCE
Refills: 0 | Status: COMPLETED | OUTPATIENT
Start: 2023-06-13 | End: 2023-06-13

## 2023-06-13 RX ORDER — OXYCODONE HYDROCHLORIDE 5 MG/1
1 TABLET ORAL
Qty: 30 | Refills: 0
Start: 2023-06-13

## 2023-06-13 RX ORDER — AMLODIPINE BESYLATE 2.5 MG/1
10 TABLET ORAL ONCE
Refills: 0 | Status: COMPLETED | OUTPATIENT
Start: 2023-06-13 | End: 2023-06-13

## 2023-06-13 RX ORDER — LEVETIRACETAM 250 MG/1
1 TABLET, FILM COATED ORAL
Qty: 30 | Refills: 0
Start: 2023-06-13

## 2023-06-13 RX ORDER — HEPARIN SODIUM 5000 [USP'U]/ML
5000 INJECTION INTRAVENOUS; SUBCUTANEOUS EVERY 8 HOURS
Refills: 0 | Status: DISCONTINUED | OUTPATIENT
Start: 2023-06-13 | End: 2023-06-13

## 2023-06-13 RX ORDER — CYCLOBENZAPRINE HYDROCHLORIDE 10 MG/1
1 TABLET, FILM COATED ORAL
Qty: 0 | Refills: 0 | DISCHARGE
Start: 2023-06-13

## 2023-06-13 RX ADMIN — OXYCODONE HYDROCHLORIDE 5 MILLIGRAM(S): 5 TABLET ORAL at 12:18

## 2023-06-13 RX ADMIN — GABAPENTIN 300 MILLIGRAM(S): 400 CAPSULE ORAL at 05:39

## 2023-06-13 RX ADMIN — OXYCODONE HYDROCHLORIDE 5 MILLIGRAM(S): 5 TABLET ORAL at 05:38

## 2023-06-13 RX ADMIN — Medication 10 MILLIGRAM(S): at 01:58

## 2023-06-13 RX ADMIN — PANTOPRAZOLE SODIUM 40 MILLIGRAM(S): 20 TABLET, DELAYED RELEASE ORAL at 05:38

## 2023-06-13 RX ADMIN — HEPARIN SODIUM 5000 UNIT(S): 5000 INJECTION INTRAVENOUS; SUBCUTANEOUS at 14:33

## 2023-06-13 RX ADMIN — Medication 650 MILLIGRAM(S): at 09:32

## 2023-06-13 RX ADMIN — GABAPENTIN 300 MILLIGRAM(S): 400 CAPSULE ORAL at 14:32

## 2023-06-13 RX ADMIN — Medication 100 MILLIGRAM(S): at 05:39

## 2023-06-13 RX ADMIN — AMLODIPINE BESYLATE 10 MILLIGRAM(S): 2.5 TABLET ORAL at 14:33

## 2023-06-13 RX ADMIN — AMLODIPINE BESYLATE 10 MILLIGRAM(S): 2.5 TABLET ORAL at 05:39

## 2023-06-13 RX ADMIN — OXYCODONE HYDROCHLORIDE 5 MILLIGRAM(S): 5 TABLET ORAL at 11:48

## 2023-06-13 RX ADMIN — OXYCODONE HYDROCHLORIDE 5 MILLIGRAM(S): 5 TABLET ORAL at 06:08

## 2023-06-13 RX ADMIN — LEVETIRACETAM 400 MILLIGRAM(S): 250 TABLET, FILM COATED ORAL at 05:39

## 2023-06-13 RX ADMIN — Medication 650 MILLIGRAM(S): at 10:02

## 2023-06-13 NOTE — PROVIDER CONTACT NOTE (OTHER) - SITUATION
Patient /90
pt's BP is elevated at 160/83 hr 85, RN gave PRN pain medication
pt's BP is slightly elevated at 154/78 HR 73, pt denied pain

## 2023-06-13 NOTE — DISCHARGE NOTE PROVIDER - HOSPITAL COURSE
this is a 62 yr old male with admission for a  Shunt placement pt had a lumbar drain 6/6 and had improvement 0f his ambulation , Pt had a Codman certas placed at 6 pt did well and was seen by PT and did well he was discharged home on POD #1

## 2023-06-13 NOTE — PROVIDER CONTACT NOTE (MEDICATION) - SITUATION
pt received 1gm Keppra in OR at 1545 pt is due for another dose now, can RN give it or should I reschedule it?
on patient's discharge instruction's patient's ordered for Keprra PRN is this correct?

## 2023-06-13 NOTE — DISCHARGE NOTE NURSING/CASE MANAGEMENT/SOCIAL WORK - PATIENT PORTAL LINK FT
You can access the FollowMyHealth Patient Portal offered by [facility] by registering at the following website: [email or web address]. By joining [employer] [facility]’s FollowMyHealth portal, you will also be able to view your health information using other applications (apps) compatible with our system.

## 2023-06-13 NOTE — PROVIDER CONTACT NOTE (OTHER) - ACTION/TREATMENT ORDERED:
Provider aware, no intervention at this time
MD made aware, ordered labetalol IV push.
Provider aware, no further intervention

## 2023-06-13 NOTE — DISCHARGE NOTE PROVIDER - NSDCMRMEDTOKEN_GEN_ALL_CORE_FT
amlodipine-atorvastatin 10 mg-40 mg oral tablet: 1 orally once a day  cyclobenzaprine 10 mg oral tablet: 1 tab(s) orally 3 times a day As needed Muscle Spasm  gabapentin 300 mg oral capsule: 1 cap(s) orally every 8 hours  Keppra 500 mg oral tablet: 1 tab(s) orally 2 times a day as needed for anti seizure  labetalol 100 mg oral tablet: 1 orally once a day  oxyCODONE 5 mg oral tablet: 1 tab(s) orally every 4 hours as needed for  moderate pain MDD: 6  Protonix 40 mg oral delayed release tablet: 1 orally once a day  senna leaf extract oral tablet: 2 tab(s) orally once a day (at bedtime)  Uloric 80 mg oral tablet: 1 orally once a day   amlodipine-atorvastatin 10 mg-40 mg oral tablet: 1 orally once a day  cyclobenzaprine 10 mg oral tablet: 1 tab(s) orally 3 times a day As needed Muscle Spasm  gabapentin 300 mg oral capsule: 1 cap(s) orally every 8 hours  Keppra 500 mg oral tablet: 1 tab(s) orally 2 times a day MDD: 2  labetalol 100 mg oral tablet: 1 orally once a day  oxyCODONE 5 mg oral tablet: 1 tab(s) orally every 4 hours as needed for  moderate pain MDD: 6  Protonix 40 mg oral delayed release tablet: 1 orally once a day  senna leaf extract oral tablet: 2 tab(s) orally once a day (at bedtime)  Uloric 80 mg oral tablet: 1 orally once a day

## 2023-06-13 NOTE — PROVIDER CONTACT NOTE (MEDICATION) - ACTION/TREATMENT ORDERED:
provider stated to tell patient to take medication twice daily NOT PRN and provider will fix medication submitted to pharmacy

## 2023-06-13 NOTE — DISCHARGE NOTE PROVIDER - CARE PROVIDER_API CALL
Christy Garza  Neurosurgery  [address] [place] [address], [address] [address]  Phone: [phone]  Fax: [phone]  Follow Up Time:

## 2023-06-13 NOTE — PROGRESS NOTE ADULT - SUBJECTIVE AND OBJECTIVE BOX
POD # 1    S/P  Shunt placement       pt seen and examined at bedside pt is alert , no complaints at this time         Vital Signs Last 24 Hrs  T(C): 37.3 (13 Jun 2023 12:00), Max: 37.3 (13 Jun 2023 12:00)  T(F): 99.1 (13 Jun 2023 12:00), Max: 99.1 (13 Jun 2023 12:00)  HR: 85 (13 Jun 2023 12:00) (71 - 89)  BP: 160/83 (13 Jun 2023 12:00) (144/81 - 177/90)  BP(mean): --  RR: 19 (13 Jun 2023 12:00) (16 - 21)  SpO2: 97% (13 Jun 2023 12:00) (95% - 97%)    Parameters below as of 13 Jun 2023 12:00  Patient On (Oxygen Delivery Method): room air        PHYSICAL EXAM:    Alert, Follows commands   A&OX3 , PERRL   EOM ( I )   MAEX4   MS bilateral UE's 5/5         bilateral LE's 5/5   Head incision clean dry intact   Abd incision clean dry intact       MEDICATIONS:  Antibiotics:    Neuro:  acetaminophen     Tablet .. 650 milliGRAM(s) Oral every 6 hours PRN  cyclobenzaprine 10 milliGRAM(s) Oral three times a day PRN  gabapentin 300 milliGRAM(s) Oral every 8 hours  levETIRAcetam  IVPB 500 milliGRAM(s) IV Intermittent every 12 hours  morphine  - Injectable 2 milliGRAM(s) IV Push every 4 hours PRN  oxyCODONE    IR 5 milliGRAM(s) Oral every 6 hours PRN    Anticoagulation:  heparin   Injectable 5000 Unit(s) SubCutaneous every 8 hours    OTHER:  amLODIPine   Tablet 10 milliGRAM(s) Oral daily  atorvastatin 40 milliGRAM(s) Oral at bedtime  labetalol 100 milliGRAM(s) Oral daily  pantoprazole    Tablet 40 milliGRAM(s) Oral before breakfast  senna 2 Tablet(s) Oral at bedtime    A/p            S/P  Shunt placement                  Neuro Intact                  DC home 
NEUROSURGERY POST OP NOTE:    POD# 0 S/P insertion of VPS    S: I feel good      T(C): 37.2 (06-12-23 @ 18:12), Max: 37.2 (06-12-23 @ 18:12)  HR: 71 (06-12-23 @ 18:12) (71 - 87)  BP: 144/81 (06-12-23 @ 18:12) (144/81 - 173/83)  RR: 18 (06-12-23 @ 18:12) (16 - 21)  SpO2: 96% (06-12-23 @ 18:12) (95% - 97%)        acetaminophen     Tablet .. 650 milliGRAM(s) Oral every 6 hours PRN  amLODIPine   Tablet 10 milliGRAM(s) Oral daily  atorvastatin 40 milliGRAM(s) Oral at bedtime  cyclobenzaprine 10 milliGRAM(s) Oral three times a day PRN  gabapentin 300 milliGRAM(s) Oral every 8 hours  labetalol 100 milliGRAM(s) Oral daily  levETIRAcetam  IVPB 500 milliGRAM(s) IV Intermittent every 12 hours  morphine  - Injectable 2 milliGRAM(s) IV Push every 4 hours PRN  oxyCODONE    IR 5 milliGRAM(s) Oral every 6 hours PRN  pantoprazole    Tablet 40 milliGRAM(s) Oral before breakfast  senna 2 Tablet(s) Oral at bedtime      RADIOLOGY: CT ordered    Exam: awake, alert x3, NAD, follows commands, no nausea, PETERSON well with good strength , no calf tenderness, vebalizing well, follows commands    WOUND/DRAINS: dressing dry, abd soft no pain    Assessment: 62yMale s/p placement of VPS      Plan: await HCT tonight, neuro checks, PT in am

## 2023-06-13 NOTE — DISCHARGE NOTE PROVIDER - NSDCFUADDINST_GEN_ALL_CORE_FT
May shower do not scrub incision site and use baby shampoo   May remove dressing tomorrow then leave open to the air   Call Dr Garza if you experience worsening of symptoms , drainage from the incision or fevers over 101   Follow up with Dr Garza in the office   Do not drink alcohol or drive while taking pain medications or anti seizure medications

## 2023-06-13 NOTE — DISCHARGE NOTE PROVIDER - NSDCFUSCHEDAPPT_GEN_ALL_CORE_FT
[facility]  NEUROSURG [address] [place] [address]  Scheduled Appointment: 06/21/2023     [facility]  NEUROSURG [address] [place] [address]  Scheduled Appointment: 06/26/2023

## 2023-06-13 NOTE — DISCHARGE NOTE NURSING/CASE MANAGEMENT/SOCIAL WORK - NSDCPEFALRISK_GEN_ALL_CORE
For information on Fall & Injury Prevention, visit: [email or web address] OR  [email or web address] OR  https://www.cdc.gov/steadi/patient.html

## 2023-06-13 NOTE — DISCHARGE NOTE PROVIDER - NSDCCPCAREPLAN_GEN_ALL_CORE_FT
PRINCIPAL DISCHARGE DIAGNOSIS  Diagnosis: Normal pressure hydrocephalus  Assessment and Plan of Treatment:

## 2023-06-17 DIAGNOSIS — N39.498 OTHER SPECIFIED URINARY INCONTINENCE: ICD-10-CM

## 2023-06-17 DIAGNOSIS — Z79.891 LONG TERM (CURRENT) USE OF OPIATE ANALGESIC: ICD-10-CM

## 2023-06-17 DIAGNOSIS — G91.2 (IDIOPATHIC) NORMAL PRESSURE HYDROCEPHALUS: ICD-10-CM

## 2023-06-17 DIAGNOSIS — Z79.899 OTHER LONG TERM (CURRENT) DRUG THERAPY: ICD-10-CM

## 2023-06-17 DIAGNOSIS — F09 UNSPECIFIED MENTAL DISORDER DUE TO KNOWN PHYSIOLOGICAL CONDITION: ICD-10-CM

## 2023-06-17 DIAGNOSIS — R26.81 UNSTEADINESS ON FEET: ICD-10-CM

## 2023-06-26 ENCOUNTER — APPOINTMENT (OUTPATIENT)
Dept: NEUROSURGERY | Facility: CLINIC | Age: 63
End: 2023-06-26
Payer: COMMERCIAL

## 2023-06-26 VITALS — HEIGHT: 64 IN | BODY MASS INDEX: 36.37 KG/M2 | WEIGHT: 213 LBS

## 2023-06-26 PROCEDURE — 99024 POSTOP FOLLOW-UP VISIT: CPT

## 2023-06-28 NOTE — HISTORY OF PRESENT ILLNESS
[FreeTextEntry1] : CHIEF COMPLAINT: Mr. Vaughn presents today s/p  Shunt Placement, he is accompanied by his son. He notes significant improvements with regards to ambulation post-operatively. A component of forgetfulness remains along with sedation; which they attribute to the gabapentin/Keppra.\par \par He notes reports an awareness to the sensation of the shunt catheter site on the right. \par \par Certas shunt interrogated; 6.\par \par Keppra stopped; 7 day treatment only warranted.\par  \par DIAGNOSTIC TESTING: None available for review at this time.\par \par WOUND: Area of the right temporal region inspected. Sutures removed; patient tolerated the procedure well. Abdomen and lumbar sites also inspected, wounds healing well without evidence of erythema/edema/warmth or drainage.\par \par

## 2023-06-28 NOTE — ASSESSMENT
[FreeTextEntry1] : This is a 63 yo M who presents for an initial post-operative encounter s/p  shunt placement for hydrocephalus. Notable improvements appreciated with regards to ambulation.\par \par We will obtain a CT head for our review and this is to be done within the next two weeks.\par \par We will have him return to the office within 2 weeks to review the films and to discuss shunt adjustments, if warranted.\par \par All questions and concerns have been addressed and he is largely agreeable with the proposed plan.\par \par Elizabeth Serna PA-C \par Christy Garza MD\par

## 2023-07-08 LAB
CULTURE RESULTS: SIGNIFICANT CHANGE UP
SPECIMEN SOURCE: SIGNIFICANT CHANGE UP

## 2023-07-12 ENCOUNTER — APPOINTMENT (OUTPATIENT)
Dept: NEUROSURGERY | Facility: CLINIC | Age: 63
End: 2023-07-12
Payer: COMMERCIAL

## 2023-07-12 VITALS — BODY MASS INDEX: 36.37 KG/M2 | WEIGHT: 213 LBS | HEIGHT: 64 IN

## 2023-07-12 PROCEDURE — 99024 POSTOP FOLLOW-UP VISIT: CPT

## 2023-07-12 NOTE — HISTORY OF PRESENT ILLNESS
[FreeTextEntry1] : CHIEF COMPLAINT: Mr. Vaughn presents at this time for a second post-operative encounter. He is accompained by his son and grandson. In comparison with his prior encounter, his son notes that his symptoms have slightly worsened with regards to ambulation, urinary incontinence and overall mentation.\par \par With ambulating longer distances he begins to experience a shuffling gait once again. Urinary incontinence remains an issue and has worsened since his last encounter. His son reports that he often voids prematurely and does not reach the bathroom in time. With regards to his mentation, he notes continued memory deficits with both current and remote issued. He remains largely frustrated with his continued symptoms and wishes to adjust his shunt with hopes of improvement.\par \par I have advised that we can reduce his shunt setting to 5; which would increase his drainage amount. I have educated that we are to monitor such changes closely and I have reviewed all signs/symptoms that may be of concern. He is aware that his improvements post-shunt placement may be gradual and that I would encourage that he remain at his next dosing for some time as equilibrates to this change.\par \par DIAGNOSTIC TESTING: CT Head 7-5-2023- RSNY- communicating hydrocephalus; no evidence of SDH concern\par \par PE: Area of the right temporal region inspected. No evidence of erythema, edema, warmth or tenderness to palpation. Shunt palpated with bulb return appreciated. Shunt setting confirmed at 6. I have adjusted the setting to 5 and confirmed x2.\par I have monitored him ambulating in the office hallway several times. Significant improvements in his wide based, magnetic gait noted. A slight shuffling noted.\par \par

## 2023-07-12 NOTE — ASSESSMENT
[FreeTextEntry1] : This is a 61 yo M who presents for a neurosurgical post-operative visit with regards to  shunt placement. Due to persistent urinary incontinence, memory deficits, and ambulatory difficulties I have agreed to adjust the shunt setting to 5. I will have the patient monitor his symptoms over the coming month and the patient and his son are aware of any concerning signs/symptoms that would necessitate a prompt office encounter.\par \par Repeat CT Head warranted in 4 weeks; patient to follow-up after that time to review.\par \par Elizabeth Serna PA-C

## 2023-07-26 LAB
CULTURE RESULTS: SIGNIFICANT CHANGE UP
SPECIMEN SOURCE: SIGNIFICANT CHANGE UP

## 2023-08-24 ENCOUNTER — APPOINTMENT (OUTPATIENT)
Dept: NEUROSURGERY | Facility: CLINIC | Age: 63
End: 2023-08-24
Payer: COMMERCIAL

## 2023-08-24 VITALS — BODY MASS INDEX: 36.37 KG/M2 | WEIGHT: 213 LBS | HEIGHT: 64 IN

## 2023-08-24 PROCEDURE — 99214 OFFICE O/P EST MOD 30 MIN: CPT

## 2023-08-24 NOTE — ASSESSMENT
[FreeTextEntry1] : Mr. NOBLE is doing well status post  shunt.  I have recommended his shunt setting remain at 5.  We will reassess his condition in 3 months.  In regards to his lumbar symptoms he will proceed with an MRI of the lumbar spine for further evaluation.  I will see him back in 4 weeks to discuss those results.  The patient and his family member are agreeable with the plan of care.  All questions answered today.    Mitali Leyva MS PA-C Senior Physician Assistant [facility]    Christy Garza MD FAANS Chair, Department of Neurosurgery [facility]

## 2023-08-24 NOTE — HISTORY OF PRESENT ILLNESS
[FreeTextEntry1] : Mr. NOBLE is s/p  shunt, placed on 6/12/23.  Last visit on 7/12/23 he noticed an increase in NPH symptoms.  Given these findings his shunt was decreased to 5.  Since shunt adjustment he is doing very well.  He reports improvement in regards to his mentation, urinary dysfunction, and ambulation.  His gait is more fluid and no longer magnetic/shuffling.  He is ambulating without assistance.  Incision sites have healed well.  I have checked his shunt setting today and confirmed it remains at 5.  We have reviewed a CT head from 8/17/2023 performed at [facility].  Stable ventricular size status post  shunt.  No acute intracranial abnormality.  Unfortunately, Mr. NOBLE also has chronic lower back pain with radiculopathy / neurogenic claudication. He has trialed physical therapy and a home exercise regimen without improvement.  He reports that his overall gait has improved since his  shunt however he does experience some fatigue with prolonged ambulation.  With sitting his back pain increases.  He has no imaging regarding his lumbar spine.  This will be ordered today.

## 2023-08-24 NOTE — PHYSICAL EXAM
[FreeTextEntry1] : Constitutional: Well appearing, no distress HEENT: Normocephalic Atraumatic Psychiatric: Alert and oriented to all spheres, normal mood Pulmonary: No respiratory distress Neurologic: CN II-XII grossly intact Palpation: + lumbar spasms.  Strength: Full strength in all major muscle groups Sensation: Full sensation to light touch in all extremities Reflexes: 2+ patellar 2+ ankle jerk ROM limited LS spine.  Signs: SLR negative Gait: steady, prior magnetic / shuffling gait has improved, walking w/o assistance. Incisions: well healed.

## 2023-08-24 NOTE — REASON FOR VISIT
[Family Member] : family member [de-identified] :  SHUNT PLACEMENT (Certas)  [de-identified] : 6/12/2023

## 2023-09-18 ENCOUNTER — RESULT REVIEW (OUTPATIENT)
Age: 63
End: 2023-09-18

## 2023-09-18 ENCOUNTER — OUTPATIENT (OUTPATIENT)
Dept: OUTPATIENT SERVICES | Facility: HOSPITAL | Age: 63
LOS: 1 days | End: 2023-09-18
Payer: COMMERCIAL

## 2023-09-18 DIAGNOSIS — D30.02 BENIGN NEOPLASM OF LEFT KIDNEY: Chronic | ICD-10-CM

## 2023-09-18 DIAGNOSIS — M48.061 SPINAL STENOSIS, LUMBAR REGION WITHOUT NEUROGENIC CLAUDICATION: ICD-10-CM

## 2023-09-18 PROCEDURE — 72148 MRI LUMBAR SPINE W/O DYE: CPT

## 2023-09-18 PROCEDURE — 72148 MRI LUMBAR SPINE W/O DYE: CPT | Mod: 26

## 2023-09-19 DIAGNOSIS — M48.061 SPINAL STENOSIS, LUMBAR REGION WITHOUT NEUROGENIC CLAUDICATION: ICD-10-CM

## 2023-09-20 ENCOUNTER — APPOINTMENT (OUTPATIENT)
Dept: NEUROSURGERY | Facility: CLINIC | Age: 63
End: 2023-09-20
Payer: COMMERCIAL

## 2023-09-20 VITALS — BODY MASS INDEX: 36.37 KG/M2 | HEIGHT: 64 IN | WEIGHT: 213 LBS

## 2023-09-20 DIAGNOSIS — M48.061 SPINAL STENOSIS, LUMBAR REGION WITHOUT NEUROGENIC CLAUDICATION: ICD-10-CM

## 2023-09-20 PROCEDURE — 99212 OFFICE O/P EST SF 10 MIN: CPT

## 2023-09-21 ENCOUNTER — APPOINTMENT (OUTPATIENT)
Dept: PULMONOLOGY | Facility: CLINIC | Age: 63
End: 2023-09-21
Payer: COMMERCIAL

## 2023-09-21 VITALS
HEIGHT: 64 IN | RESPIRATION RATE: 12 BRPM | HEART RATE: 85 BPM | BODY MASS INDEX: 37.56 KG/M2 | SYSTOLIC BLOOD PRESSURE: 160 MMHG | OXYGEN SATURATION: 97 % | DIASTOLIC BLOOD PRESSURE: 80 MMHG | WEIGHT: 220 LBS

## 2023-09-21 PROCEDURE — G0296 VISIT TO DETERM LDCT ELIG: CPT

## 2023-09-21 PROCEDURE — 99203 OFFICE O/P NEW LOW 30 MIN: CPT | Mod: 25

## 2023-09-21 PROCEDURE — 71046 X-RAY EXAM CHEST 2 VIEWS: CPT

## 2023-10-14 ENCOUNTER — RESULT REVIEW (OUTPATIENT)
Age: 63
End: 2023-10-14

## 2023-10-14 ENCOUNTER — OUTPATIENT (OUTPATIENT)
Dept: OUTPATIENT SERVICES | Facility: HOSPITAL | Age: 63
LOS: 1 days | End: 2023-10-14
Payer: COMMERCIAL

## 2023-10-14 DIAGNOSIS — R91.1 SOLITARY PULMONARY NODULE: ICD-10-CM

## 2023-10-14 DIAGNOSIS — G91.2 (IDIOPATHIC) NORMAL PRESSURE HYDROCEPHALUS: ICD-10-CM

## 2023-10-14 DIAGNOSIS — D30.02 BENIGN NEOPLASM OF LEFT KIDNEY: Chronic | ICD-10-CM

## 2023-10-14 PROCEDURE — 76700 US EXAM ABDOM COMPLETE: CPT | Mod: 26

## 2023-10-14 PROCEDURE — 71271 CT THORAX LUNG CANCER SCR C-: CPT

## 2023-10-14 PROCEDURE — 70450 CT HEAD/BRAIN W/O DYE: CPT | Mod: 26

## 2023-10-14 PROCEDURE — 70450 CT HEAD/BRAIN W/O DYE: CPT

## 2023-10-14 PROCEDURE — 76700 US EXAM ABDOM COMPLETE: CPT

## 2023-10-14 PROCEDURE — 71271 CT THORAX LUNG CANCER SCR C-: CPT | Mod: 26

## 2023-10-15 DIAGNOSIS — R91.1 SOLITARY PULMONARY NODULE: ICD-10-CM

## 2023-10-15 DIAGNOSIS — G91.2 (IDIOPATHIC) NORMAL PRESSURE HYDROCEPHALUS: ICD-10-CM

## 2023-10-19 ENCOUNTER — APPOINTMENT (OUTPATIENT)
Dept: NEUROSURGERY | Facility: CLINIC | Age: 63
End: 2023-10-19
Payer: COMMERCIAL

## 2023-10-19 PROCEDURE — 99442: CPT

## 2023-10-22 NOTE — ASU PREOP CHECKLIST - LOOSE TEETH
Pt released from our facility on Thursday following an admission since Tuesday. He is awaiting a colonoscopy and GI appt. He awoke with extreme left side abdominal and flank pain. He admits that he has not yet taken his BP meds this morning. Dr Richard Almeida at bedside at this time.      Cleo Trejo  10/22/23 4251 no

## 2023-11-03 ENCOUNTER — OUTPATIENT (OUTPATIENT)
Dept: OUTPATIENT SERVICES | Facility: HOSPITAL | Age: 63
LOS: 1 days | Discharge: HOME | End: 2023-11-03
Payer: COMMERCIAL

## 2023-11-03 ENCOUNTER — APPOINTMENT (OUTPATIENT)
Dept: SLEEP CENTER | Facility: HOSPITAL | Age: 63
End: 2023-11-03
Payer: COMMERCIAL

## 2023-11-03 DIAGNOSIS — G47.33 OBSTRUCTIVE SLEEP APNEA (ADULT) (PEDIATRIC): ICD-10-CM

## 2023-11-03 DIAGNOSIS — D30.02 BENIGN NEOPLASM OF LEFT KIDNEY: Chronic | ICD-10-CM

## 2023-11-03 PROCEDURE — 95800 SLP STDY UNATTENDED: CPT

## 2023-11-03 PROCEDURE — 95800 SLP STDY UNATTENDED: CPT | Mod: 26

## 2023-11-07 DIAGNOSIS — G47.33 OBSTRUCTIVE SLEEP APNEA (ADULT) (PEDIATRIC): ICD-10-CM

## 2023-11-08 ENCOUNTER — APPOINTMENT (OUTPATIENT)
Dept: NEUROSURGERY | Facility: CLINIC | Age: 63
End: 2023-11-08
Payer: COMMERCIAL

## 2023-11-08 VITALS — WEIGHT: 220 LBS | BODY MASS INDEX: 37.56 KG/M2 | HEIGHT: 64 IN

## 2023-11-08 VITALS — WEIGHT: 220 LBS | HEIGHT: 64 IN | BODY MASS INDEX: 37.56 KG/M2

## 2023-11-08 PROCEDURE — 99214 OFFICE O/P EST MOD 30 MIN: CPT | Mod: 25

## 2023-11-08 PROCEDURE — 99214 OFFICE O/P EST MOD 30 MIN: CPT

## 2023-11-08 PROCEDURE — 62252 CSF SHUNT REPROGRAM: CPT

## 2023-12-07 ENCOUNTER — APPOINTMENT (OUTPATIENT)
Dept: PULMONOLOGY | Facility: CLINIC | Age: 63
End: 2023-12-07
Payer: COMMERCIAL

## 2023-12-07 VITALS
OXYGEN SATURATION: 96 % | DIASTOLIC BLOOD PRESSURE: 60 MMHG | HEIGHT: 64 IN | BODY MASS INDEX: 36.19 KG/M2 | HEART RATE: 83 BPM | SYSTOLIC BLOOD PRESSURE: 124 MMHG | WEIGHT: 212 LBS

## 2023-12-07 PROCEDURE — 99213 OFFICE O/P EST LOW 20 MIN: CPT

## 2023-12-08 ENCOUNTER — APPOINTMENT (OUTPATIENT)
Dept: SLEEP CENTER | Facility: HOSPITAL | Age: 63
End: 2023-12-08
Payer: COMMERCIAL

## 2023-12-08 ENCOUNTER — OUTPATIENT (OUTPATIENT)
Dept: OUTPATIENT SERVICES | Facility: HOSPITAL | Age: 63
LOS: 1 days | Discharge: HOME | End: 2023-12-08
Payer: COMMERCIAL

## 2023-12-08 DIAGNOSIS — D30.02 BENIGN NEOPLASM OF LEFT KIDNEY: Chronic | ICD-10-CM

## 2023-12-08 DIAGNOSIS — G47.33 OBSTRUCTIVE SLEEP APNEA (ADULT) (PEDIATRIC): ICD-10-CM

## 2023-12-08 PROCEDURE — 95811 POLYSOM 6/>YRS CPAP 4/> PARM: CPT

## 2023-12-08 PROCEDURE — 95811 POLYSOM 6/>YRS CPAP 4/> PARM: CPT | Mod: 26

## 2023-12-12 ENCOUNTER — OUTPATIENT (OUTPATIENT)
Dept: OUTPATIENT SERVICES | Facility: HOSPITAL | Age: 63
LOS: 1 days | End: 2023-12-12
Payer: COMMERCIAL

## 2023-12-12 ENCOUNTER — RESULT REVIEW (OUTPATIENT)
Age: 63
End: 2023-12-12

## 2023-12-12 DIAGNOSIS — G91.2 (IDIOPATHIC) NORMAL PRESSURE HYDROCEPHALUS: ICD-10-CM

## 2023-12-12 DIAGNOSIS — D30.02 BENIGN NEOPLASM OF LEFT KIDNEY: Chronic | ICD-10-CM

## 2023-12-12 DIAGNOSIS — Z00.8 ENCOUNTER FOR OTHER GENERAL EXAMINATION: ICD-10-CM

## 2023-12-12 DIAGNOSIS — G47.33 OBSTRUCTIVE SLEEP APNEA (ADULT) (PEDIATRIC): ICD-10-CM

## 2023-12-12 PROCEDURE — 70450 CT HEAD/BRAIN W/O DYE: CPT

## 2023-12-12 PROCEDURE — 70450 CT HEAD/BRAIN W/O DYE: CPT | Mod: 26

## 2023-12-13 DIAGNOSIS — G91.2 (IDIOPATHIC) NORMAL PRESSURE HYDROCEPHALUS: ICD-10-CM

## 2023-12-14 ENCOUNTER — OUTPATIENT (OUTPATIENT)
Dept: OUTPATIENT SERVICES | Facility: HOSPITAL | Age: 63
LOS: 1 days | End: 2023-12-14
Payer: COMMERCIAL

## 2023-12-14 ENCOUNTER — APPOINTMENT (OUTPATIENT)
Dept: NEUROSURGERY | Facility: CLINIC | Age: 63
End: 2023-12-14
Payer: COMMERCIAL

## 2023-12-14 ENCOUNTER — APPOINTMENT (OUTPATIENT)
Dept: PULMONOLOGY | Facility: HOSPITAL | Age: 63
End: 2023-12-14
Payer: COMMERCIAL

## 2023-12-14 VITALS — WEIGHT: 212 LBS | BODY MASS INDEX: 36.19 KG/M2 | HEIGHT: 64 IN

## 2023-12-14 DIAGNOSIS — D30.02 BENIGN NEOPLASM OF LEFT KIDNEY: Chronic | ICD-10-CM

## 2023-12-14 DIAGNOSIS — R06.02 SHORTNESS OF BREATH: ICD-10-CM

## 2023-12-14 PROCEDURE — 99213 OFFICE O/P EST LOW 20 MIN: CPT | Mod: 25

## 2023-12-14 PROCEDURE — 94664 DEMO&/EVAL PT USE INHALER: CPT

## 2023-12-14 PROCEDURE — 94727 GAS DIL/WSHOT DETER LNG VOL: CPT | Mod: 26

## 2023-12-14 PROCEDURE — 94060 EVALUATION OF WHEEZING: CPT | Mod: 26

## 2023-12-14 PROCEDURE — 94729 DIFFUSING CAPACITY: CPT

## 2023-12-14 PROCEDURE — 94729 DIFFUSING CAPACITY: CPT | Mod: 26

## 2023-12-14 PROCEDURE — 94726 PLETHYSMOGRAPHY LUNG VOLUMES: CPT

## 2023-12-14 PROCEDURE — 62252 CSF SHUNT REPROGRAM: CPT

## 2023-12-14 PROCEDURE — 99213 OFFICE O/P EST LOW 20 MIN: CPT

## 2023-12-14 NOTE — ASSESSMENT
[FreeTextEntry1] : 63 yo M with underlying NPH presents for neurosurgical assessment s/p shunt settings change. He notes excellent relief since adjusting the shunt to 4. Significant improvements in mentation, ambulation, and overall quality of life noted and he remains largely content with his improvements thus far. Repeat CT Head reviewed, no evidence of SDH appreciated.  He will be going on vacation over the coming weeks and will return to our office in 2-3 months per his request. He is aware that if any questions or concerns were to arise he can contact us promptly.  Elizabeth Serna PA-C (RG)

## 2023-12-14 NOTE — HISTORY OF PRESENT ILLNESS
[FreeTextEntry1] : Mr. GAGAN PRIETO is a 63 yo M who presents for neurosurgical revisit accompanied by his wife. As a review, at his last encounter we altered his Certas shunt from 5 to 4 as he had been complaining of continued headache, memory changes, as well as gait imbalance.  With the setting change he and his wife endorse a significant improvement in his condition. His wife appreciates that he has been much more alert with improvements in ambulation and overall mentation. They are both largely content with his improvements at this time and we have agreed to keep the shunt at a setting of 4.  He notes that he booked a vacation to [facility] and is thankful for his improvements and quality of life improvements through the placement of the shunt.  CT Head- [facility]- 12/12/2023- continued ventriculomegaly. No evidence of SDH. Shunt with proper placement  PHYSICAL EXAM:   Constitutional: Well appearing, no distress HEENT: Normocephalic Atraumatic Psychiatric: Alert and oriented to all spheres, normal mood Pulmonary: No respiratory distress  Neurologic:  CN II-XII grossly intact Palpation: no pain to palpation  Strength: Full strength in all major muscle groups Sensation: Full sensation to light touch in all extremities Reflexes:   2+ biceps  2+ triceps   No Lambert's  No clonus  ROM intact  Gait: steady, walking w/o assistance.

## 2023-12-15 DIAGNOSIS — R06.02 SHORTNESS OF BREATH: ICD-10-CM

## 2024-01-08 NOTE — OCCUPATIONAL THERAPY INITIAL EVALUATION ADULT - TOILETING, PREVIOUS LEVEL OF FUNCTION, OT EVAL
Patient has a $15 bill for his copay as his chronic medical conditions (HTN, Hyperlipidemia and anxiety) were also addressed at the time of service.  Patient's medications were refilled and labs ordered for his chronic medical conditions.  Patient had a routine follow up along with his Annual Wellness Exam.    Spoke with coding and provider and this visit was billed correctly.    Please call patient and let him know.   independent

## 2024-02-08 ENCOUNTER — APPOINTMENT (OUTPATIENT)
Dept: NEUROSURGERY | Facility: CLINIC | Age: 64
End: 2024-02-08
Payer: COMMERCIAL

## 2024-02-08 VITALS — BODY MASS INDEX: 36.19 KG/M2 | HEIGHT: 64 IN | WEIGHT: 212 LBS

## 2024-02-08 PROCEDURE — 99212 OFFICE O/P EST SF 10 MIN: CPT

## 2024-02-09 NOTE — HISTORY OF PRESENT ILLNESS
[FreeTextEntry1] : Mr. NOBLE is a pleasant 63-year-old male presenting to the neurosurgery office today alongside his wife as a follow-up status post (Certas)  shunt placement on 6/12/2023 for NPH.  Patient appears to be doing well and states improvement since the shunt placement in regards to his overall cognition and ambulation.  He is still experiencing some urinary incontinence but this has not changed significantly since last seen in the office. Physical complaints today include pain to the back of the head when sneezing or bending forward.  Certas shunt verified at the setting of 4, left unchanged   Last CT was performed 8/17/2023 at [facility] which identified stable ventricular size status post  shunt and no acute intracranial abnormality. Due to persistent symptoms postoperatively a repeat CT head noncontrast is to be performed in approximately 1 month. Patient endorses he has also arranged for an ENT consult due to sinus concerns. He will return to the office after the CT is performed to review results and delineate a further plan.  PHYSICAL EXAM: Constitutional: Well appearing, no distress HEENT: Normocephalic Atraumatic Psychiatric: Alert and oriented to all spheres, normal mood Pulmonary: No respiratory distress  Neurologic: CN II-XII grossly intact Palpation: No pain to palpation Strength: Full strength in all major muscle groups Sensation: Full sensation to light touch in all extremities Reflexes: 2+ biceps 2+ triceps  No Lambert's No Clonus  ROM intact  Gait: Steady, walking w/o assistance.

## 2024-02-09 NOTE — ASSESSMENT
[FreeTextEntry1] : 62yo M status post (Certas)  shunt placement on 6/12/2023 for NPH. He is overall doing well but has some complaints today of pain to the back of the head when sneezing or bending forward.  No recent imaging since June.  As a result, CT head noncontrast to be performed in approximately 1 month after which the patient will return to the office to review results.  Shunt was left at 4.  I personally reviewed with the NP, this patient's history and physical exam findings, as documented above. I have discussed the relevant areas of concern, having direct implications to the presenting problems and illnesses, and I have personally examined all pertinent and positive and negative findings, which impact their neurosurgical treatment.   Elisa Mancuso MS, FNP-BC  Christy Garza MD, FAANS 
Statement Selected

## 2024-03-07 ENCOUNTER — APPOINTMENT (OUTPATIENT)
Dept: PULMONOLOGY | Facility: CLINIC | Age: 64
End: 2024-03-07
Payer: COMMERCIAL

## 2024-03-07 VITALS
OXYGEN SATURATION: 97 % | SYSTOLIC BLOOD PRESSURE: 130 MMHG | HEIGHT: 64 IN | BODY MASS INDEX: 35 KG/M2 | HEART RATE: 74 BPM | WEIGHT: 205 LBS | DIASTOLIC BLOOD PRESSURE: 80 MMHG | RESPIRATION RATE: 14 BRPM

## 2024-03-07 DIAGNOSIS — F17.210 NICOTINE DEPENDENCE, CIGARETTES, UNCOMPLICATED: ICD-10-CM

## 2024-03-07 DIAGNOSIS — R06.02 SHORTNESS OF BREATH: ICD-10-CM

## 2024-03-07 DIAGNOSIS — G47.33 OBSTRUCTIVE SLEEP APNEA (ADULT) (PEDIATRIC): ICD-10-CM

## 2024-03-07 PROCEDURE — 99214 OFFICE O/P EST MOD 30 MIN: CPT

## 2024-03-07 RX ORDER — ALBUTEROL SULFATE 90 UG/1
108 (90 BASE) AEROSOL, METERED RESPIRATORY (INHALATION)
Qty: 1 | Refills: 3 | Status: ACTIVE | COMMUNITY
Start: 2024-03-07

## 2024-03-07 NOTE — PHYSICAL EXAM
[No Acute Distress] : no acute distress [Normal Oropharynx] : normal oropharynx [Normal Appearance] : normal appearance [No Neck Mass] : no neck mass [Normal Rate/Rhythm] : normal rate/rhythm [Normal S1, S2] : normal s1, s2 [No Murmurs] : no murmurs [No Resp Distress] : no resp distress [Clear to Auscultation Bilaterally] : clear to auscultation bilaterally [Benign] : benign [No Abnormalities] : no abnormalities [Normal Gait] : normal gait [No Cyanosis] : no cyanosis [No Clubbing] : no clubbing [No Edema] : no edema [FROM] : FROM [No Focal Deficits] : no focal deficits [Normal Color/ Pigmentation] : normal color/ pigmentation [Oriented x3] : oriented x3 [Normal Affect] : normal affect

## 2024-03-07 NOTE — HISTORY OF PRESENT ILLNESS
[Follow-Up - Routine Clinic] : a routine clinic follow-up of [None] : No associated symptoms are reported [Good Compliance] : good compliance with treatment [Good Tolerance] : good tolerance of treatment [Good Symptom Control] : good symptom control [de-identified] : APAP

## 2024-03-07 NOTE — ASSESSMENT
[FreeTextEntry1] : Severe BEVERLEY on HST optimum CPAP 13 cm H2O ON APAP  45 PY cigarette smoker. Continues to smoke. About 1 p per day.

## 2024-03-07 NOTE — COUNSELING
[Use of nicotine replacement therapies and other medications discussed] : Use of nicotine replacement therapies and other medications discussed [Cessation strategies including cessation program discussed] : Cessation strategies including cessation program discussed [Yes] : Willing to quit smoking

## 2024-03-21 ENCOUNTER — RESULT REVIEW (OUTPATIENT)
Age: 64
End: 2024-03-21

## 2024-03-21 ENCOUNTER — OUTPATIENT (OUTPATIENT)
Dept: OUTPATIENT SERVICES | Facility: HOSPITAL | Age: 64
LOS: 1 days | End: 2024-03-21
Payer: COMMERCIAL

## 2024-03-21 DIAGNOSIS — G91.2 (IDIOPATHIC) NORMAL PRESSURE HYDROCEPHALUS: ICD-10-CM

## 2024-03-21 DIAGNOSIS — D30.02 BENIGN NEOPLASM OF LEFT KIDNEY: Chronic | ICD-10-CM

## 2024-03-21 DIAGNOSIS — Z98.2 PRESENCE OF CEREBROSPINAL FLUID DRAINAGE DEVICE: ICD-10-CM

## 2024-03-21 PROCEDURE — 70450 CT HEAD/BRAIN W/O DYE: CPT

## 2024-03-21 PROCEDURE — 70450 CT HEAD/BRAIN W/O DYE: CPT | Mod: 26

## 2024-03-22 ENCOUNTER — NON-APPOINTMENT (OUTPATIENT)
Age: 64
End: 2024-03-22

## 2024-03-22 DIAGNOSIS — G91.2 (IDIOPATHIC) NORMAL PRESSURE HYDROCEPHALUS: ICD-10-CM

## 2024-03-28 ENCOUNTER — APPOINTMENT (OUTPATIENT)
Dept: NEUROSURGERY | Facility: CLINIC | Age: 64
End: 2024-03-28
Payer: COMMERCIAL

## 2024-03-28 VITALS — WEIGHT: 198 LBS | HEIGHT: 64 IN | BODY MASS INDEX: 33.8 KG/M2

## 2024-03-28 DIAGNOSIS — G91.2 (IDIOPATHIC) NORMAL PRESSURE HYDROCEPHALUS: ICD-10-CM

## 2024-03-28 DIAGNOSIS — Z98.2 PRESENCE OF CEREBROSPINAL FLUID DRAINAGE DEVICE: ICD-10-CM

## 2024-03-28 PROCEDURE — 62252 CSF SHUNT REPROGRAM: CPT

## 2024-03-28 PROCEDURE — 99213 OFFICE O/P EST LOW 20 MIN: CPT | Mod: 25

## 2024-03-28 NOTE — HISTORY OF PRESENT ILLNESS
[FreeTextEntry1] : This is a 63-year-old gentleman who presents for neurosurgical revisit, he has a history of NPH for which she underwent a  shunt placement (Certas set at 4) on 6/12/2023. He continues to report significant improvements post-shunt placement. He notes the ability to ambulate independently without dizziness, balance changes. No urinary incontinence, headache, fever, chills, nausea, vomiting. He remains largely content with his improvements post-shunt placement and wishes to continue with the current settings at 4.   IMAGING: CT Head- 3/22/2024- Stable position of a right parietal approach ventricular shunt catheter. Stable moderate/severe hydrocephalus.  PHYSICAL EXAM:   Constitutional: Well appearing, no distress HEENT: Normocephalic Atraumatic Psychiatric: Alert and oriented to all spheres, normal mood Pulmonary: No respiratory distress  Neurologic:  CN II-XII grossly intact Palpation: no pain to palpation  Strength: Full strength in all major muscle groups Sensation: Full sensation to light touch in all extremities Reflexes:   2+ biceps  2+ triceps   No Lambert's  No clonus  ROM   Gait: steady, walking w/o assistance.

## 2024-03-28 NOTE — ASSESSMENT
[FreeTextEntry1] : This is a 63-year-old male who presents for neurosurgical revisit s/p Certas shunt placement set at 4. Repeat CT Head appears stable without evidence of SDH; ventricular size remains stable. He is largely content with his improvements post-shunt placement and wishes to return to the office in 4 months for a check-up.  He is to contact me with any questions or concerns in the interim.  DULCE MARIA Broussard MD

## 2024-07-10 ENCOUNTER — APPOINTMENT (OUTPATIENT)
Dept: NEUROSURGERY | Facility: CLINIC | Age: 64
End: 2024-07-10
Payer: COMMERCIAL

## 2024-07-10 VITALS — BODY MASS INDEX: 33.8 KG/M2 | HEIGHT: 64 IN | WEIGHT: 198 LBS

## 2024-07-10 DIAGNOSIS — G91.2 (IDIOPATHIC) NORMAL PRESSURE HYDROCEPHALUS: ICD-10-CM

## 2024-07-10 DIAGNOSIS — Z98.2 PRESENCE OF CEREBROSPINAL FLUID DRAINAGE DEVICE: ICD-10-CM

## 2024-07-10 PROCEDURE — 62252 CSF SHUNT REPROGRAM: CPT

## 2024-07-10 PROCEDURE — 99213 OFFICE O/P EST LOW 20 MIN: CPT

## 2024-07-10 PROCEDURE — 99213 OFFICE O/P EST LOW 20 MIN: CPT | Mod: 25

## 2024-09-18 ENCOUNTER — APPOINTMENT (OUTPATIENT)
Dept: PULMONOLOGY | Facility: CLINIC | Age: 64
End: 2024-09-18
Payer: COMMERCIAL

## 2024-09-18 DIAGNOSIS — R06.02 SHORTNESS OF BREATH: ICD-10-CM

## 2024-09-18 DIAGNOSIS — F17.210 NICOTINE DEPENDENCE, CIGARETTES, UNCOMPLICATED: ICD-10-CM

## 2024-09-18 DIAGNOSIS — G47.33 OBSTRUCTIVE SLEEP APNEA (ADULT) (PEDIATRIC): ICD-10-CM

## 2024-09-18 PROCEDURE — G0296 VISIT TO DETERM LDCT ELIG: CPT | Mod: 95

## 2024-09-18 PROCEDURE — 99213 OFFICE O/P EST LOW 20 MIN: CPT

## 2024-09-18 NOTE — COUNSELING
[Cessation strategies including cessation program discussed] : Cessation strategies including cessation program discussed [Use of nicotine replacement therapies and other medications discussed] : Use of nicotine replacement therapies and other medications discussed [Yes] : Willing to quit smoking [ - Annual Lung Cancer Screening/Share Decision Making Discussion] : Annual Lung Cancer Screening/Share Decision Making Discussion. (I have advised this patient to have a Low Dose CT (LDCT) scan of the lungs and have discussed the following with the patient in a shared decision making discussion:   Benefits of Detection and Early Treatment: There is adequate evidence that annual screening for lung cancer with LDCT in a population of high-risk persons can prevent a substantial number of lung cancer-related deaths. The magnitude of benefit depends on the individual patient's risk for lung cancer, as those who are at highest risk are most likely to benefit. Screening cannot prevent most lung cancer-related deaths, and does not replace smoking cessation. Harms of Detection and Early Intervention and Treatment: The harms associated with LDCT screening include false-negative and false-positive results, incidental findings, over diagnosis, and radiation exposure. False-positive LDCT results occur in a substantial proportion of screened persons; 95% of all positive results do not lead to a diagnosis of cancer. In a high-quality screening program, further imaging can resolve most false-positive results; however, some patients may require invasive procedures. Radiation harms, including cancer resulting from cumulative exposure to radiation, vary depending on the age at the start of screening; the number of scans received; and the person's exposure to other sources of radiation, particularly other medical imaging.)

## 2024-09-18 NOTE — HISTORY OF PRESENT ILLNESS
[Follow-Up - Routine Clinic] : a routine clinic follow-up of [None] : No associated symptoms are reported [Good Compliance] : good compliance with treatment [Good Tolerance] : good tolerance of treatment [Good Symptom Control] : good symptom control [Home] : at home, [unfilled] , at the time of the visit. [de-identified] : APAP [Medical Office: ([facility])___] : at the medical office located in  [Verbal consent obtained from patient] : the patient, [unfilled]

## 2024-11-03 ENCOUNTER — OUTPATIENT (OUTPATIENT)
Dept: OUTPATIENT SERVICES | Facility: HOSPITAL | Age: 64
LOS: 1 days | End: 2024-11-03
Payer: COMMERCIAL

## 2024-11-03 DIAGNOSIS — F17.210 NICOTINE DEPENDENCE, CIGARETTES, UNCOMPLICATED: ICD-10-CM

## 2024-11-03 DIAGNOSIS — Z00.8 ENCOUNTER FOR OTHER GENERAL EXAMINATION: ICD-10-CM

## 2024-11-03 DIAGNOSIS — D30.02 BENIGN NEOPLASM OF LEFT KIDNEY: Chronic | ICD-10-CM

## 2024-11-03 PROCEDURE — 71271 CT THORAX LUNG CANCER SCR C-: CPT | Mod: 26

## 2024-11-03 PROCEDURE — 71271 CT THORAX LUNG CANCER SCR C-: CPT

## 2024-11-04 DIAGNOSIS — F17.210 NICOTINE DEPENDENCE, CIGARETTES, UNCOMPLICATED: ICD-10-CM

## 2025-01-15 ENCOUNTER — NON-APPOINTMENT (OUTPATIENT)
Age: 65
End: 2025-01-15

## 2025-01-15 ENCOUNTER — APPOINTMENT (OUTPATIENT)
Dept: NEUROSURGERY | Facility: CLINIC | Age: 65
End: 2025-01-15
Payer: COMMERCIAL

## 2025-01-15 VITALS — HEIGHT: 64 IN | WEIGHT: 201 LBS | BODY MASS INDEX: 34.31 KG/M2

## 2025-01-15 DIAGNOSIS — G91.2 (IDIOPATHIC) NORMAL PRESSURE HYDROCEPHALUS: ICD-10-CM

## 2025-01-15 PROCEDURE — 99212 OFFICE O/P EST SF 10 MIN: CPT

## 2025-02-09 ENCOUNTER — OUTPATIENT (OUTPATIENT)
Dept: OUTPATIENT SERVICES | Facility: HOSPITAL | Age: 65
LOS: 1 days | End: 2025-02-09
Payer: COMMERCIAL

## 2025-02-09 DIAGNOSIS — Z00.8 ENCOUNTER FOR OTHER GENERAL EXAMINATION: ICD-10-CM

## 2025-02-09 DIAGNOSIS — D30.02 BENIGN NEOPLASM OF LEFT KIDNEY: Chronic | ICD-10-CM

## 2025-02-09 DIAGNOSIS — G91.2 (IDIOPATHIC) NORMAL PRESSURE HYDROCEPHALUS: ICD-10-CM

## 2025-02-09 PROCEDURE — 70450 CT HEAD/BRAIN W/O DYE: CPT | Mod: 26

## 2025-02-09 PROCEDURE — 70450 CT HEAD/BRAIN W/O DYE: CPT

## 2025-02-10 DIAGNOSIS — G91.2 (IDIOPATHIC) NORMAL PRESSURE HYDROCEPHALUS: ICD-10-CM

## 2025-03-13 ENCOUNTER — APPOINTMENT (OUTPATIENT)
Dept: PULMONOLOGY | Facility: CLINIC | Age: 65
End: 2025-03-13
Payer: COMMERCIAL

## 2025-03-13 DIAGNOSIS — G47.33 OBSTRUCTIVE SLEEP APNEA (ADULT) (PEDIATRIC): ICD-10-CM

## 2025-03-13 DIAGNOSIS — F17.210 NICOTINE DEPENDENCE, CIGARETTES, UNCOMPLICATED: ICD-10-CM

## 2025-03-13 DIAGNOSIS — R06.02 SHORTNESS OF BREATH: ICD-10-CM

## 2025-03-13 PROCEDURE — 99214 OFFICE O/P EST MOD 30 MIN: CPT | Mod: 95

## 2025-03-26 ENCOUNTER — APPOINTMENT (OUTPATIENT)
Dept: NEUROSURGERY | Facility: CLINIC | Age: 65
End: 2025-03-26
Payer: COMMERCIAL

## 2025-03-26 ENCOUNTER — NON-APPOINTMENT (OUTPATIENT)
Age: 65
End: 2025-03-26

## 2025-03-26 VITALS — HEIGHT: 64 IN | BODY MASS INDEX: 33.97 KG/M2 | WEIGHT: 199 LBS

## 2025-03-26 DIAGNOSIS — G91.2 (IDIOPATHIC) NORMAL PRESSURE HYDROCEPHALUS: ICD-10-CM

## 2025-03-26 DIAGNOSIS — Z98.2 PRESENCE OF CEREBROSPINAL FLUID DRAINAGE DEVICE: ICD-10-CM

## 2025-03-26 PROCEDURE — 99213 OFFICE O/P EST LOW 20 MIN: CPT

## 2025-06-18 ENCOUNTER — APPOINTMENT (OUTPATIENT)
Dept: NEUROSURGERY | Facility: CLINIC | Age: 65
End: 2025-06-18
Payer: COMMERCIAL

## 2025-06-18 VITALS — WEIGHT: 199 LBS | HEIGHT: 64 IN | BODY MASS INDEX: 33.97 KG/M2

## 2025-06-18 PROCEDURE — 99213 OFFICE O/P EST LOW 20 MIN: CPT
